# Patient Record
Sex: FEMALE | Race: BLACK OR AFRICAN AMERICAN | NOT HISPANIC OR LATINO | Employment: FULL TIME | RURAL
[De-identification: names, ages, dates, MRNs, and addresses within clinical notes are randomized per-mention and may not be internally consistent; named-entity substitution may affect disease eponyms.]

---

## 2021-04-16 ENCOUNTER — OFFICE VISIT (OUTPATIENT)
Dept: FAMILY MEDICINE | Facility: CLINIC | Age: 46
End: 2021-04-16
Payer: COMMERCIAL

## 2021-04-16 VITALS
BODY MASS INDEX: 45.24 KG/M2 | SYSTOLIC BLOOD PRESSURE: 120 MMHG | HEIGHT: 64 IN | RESPIRATION RATE: 16 BRPM | DIASTOLIC BLOOD PRESSURE: 80 MMHG | WEIGHT: 265 LBS | TEMPERATURE: 97 F | HEART RATE: 100 BPM

## 2021-04-16 DIAGNOSIS — I10 ESSENTIAL HYPERTENSION: ICD-10-CM

## 2021-04-16 DIAGNOSIS — Z79.899 ENCOUNTER FOR LONG-TERM (CURRENT) USE OF OTHER MEDICATIONS: ICD-10-CM

## 2021-04-16 PROCEDURE — 99213 OFFICE O/P EST LOW 20 MIN: CPT | Mod: ,,, | Performed by: FAMILY MEDICINE

## 2021-04-16 PROCEDURE — 99213 PR OFFICE/OUTPT VISIT, EST, LEVL III, 20-29 MIN: ICD-10-PCS | Mod: ,,, | Performed by: FAMILY MEDICINE

## 2021-04-16 RX ORDER — LOSARTAN POTASSIUM 100 MG/1
100 TABLET ORAL DAILY
COMMUNITY
End: 2023-10-19

## 2021-04-16 RX ORDER — CLONIDINE HYDROCHLORIDE 0.1 MG/1
0.1 TABLET ORAL 2 TIMES DAILY
COMMUNITY

## 2021-04-16 RX ORDER — DOXAZOSIN 4 MG/1
4 TABLET ORAL 2 TIMES DAILY
COMMUNITY
End: 2021-04-18 | Stop reason: ALTCHOICE

## 2021-04-16 RX ORDER — LEVOMILNACIPRAN HYDROCHLORIDE 80 MG/1
80 CAPSULE, EXTENDED RELEASE ORAL DAILY
COMMUNITY
End: 2021-04-28 | Stop reason: SDUPTHER

## 2021-04-16 RX ORDER — SEMAGLUTIDE 1.34 MG/ML
1 INJECTION, SOLUTION SUBCUTANEOUS
COMMUNITY
End: 2021-11-24 | Stop reason: DRUGHIGH

## 2021-04-16 RX ORDER — ERGOCALCIFEROL 1.25 MG/1
50000 CAPSULE ORAL
COMMUNITY
End: 2022-11-04

## 2021-04-16 RX ORDER — METFORMIN HYDROCHLORIDE 500 MG/1
500 TABLET ORAL 2 TIMES DAILY
COMMUNITY
End: 2021-11-24 | Stop reason: SDUPTHER

## 2021-04-16 RX ORDER — AMLODIPINE BESYLATE 10 MG/1
10 TABLET ORAL DAILY
COMMUNITY
End: 2023-09-19

## 2021-04-16 RX ORDER — GABAPENTIN 300 MG/1
300 CAPSULE ORAL NIGHTLY PRN
COMMUNITY
End: 2023-09-19

## 2021-04-16 RX ORDER — HYDRALAZINE HYDROCHLORIDE 10 MG/1
10 TABLET, FILM COATED ORAL 3 TIMES DAILY PRN
COMMUNITY
End: 2021-11-18

## 2021-04-16 RX ORDER — SPIRONOLACTONE 25 MG/1
25 TABLET ORAL DAILY
COMMUNITY
End: 2021-11-18

## 2021-04-16 RX ORDER — FLUTICASONE PROPIONATE 50 MCG
2 SPRAY, SUSPENSION (ML) NASAL DAILY
COMMUNITY
End: 2021-10-21 | Stop reason: SDUPTHER

## 2021-04-28 RX ORDER — NEBIVOLOL 5 MG/1
TABLET ORAL
Qty: 60 TABLET | Refills: 0 | Status: SHIPPED | OUTPATIENT
Start: 2021-04-28 | End: 2021-04-29

## 2021-04-28 RX ORDER — LEVOMILNACIPRAN HYDROCHLORIDE 80 MG/1
80 CAPSULE, EXTENDED RELEASE ORAL DAILY
Qty: 30 CAPSULE | Refills: 11 | Status: SHIPPED | OUTPATIENT
Start: 2021-04-28 | End: 2022-05-25 | Stop reason: SDUPTHER

## 2021-04-29 ENCOUNTER — TELEPHONE (OUTPATIENT)
Dept: FAMILY MEDICINE | Facility: CLINIC | Age: 46
End: 2021-04-29

## 2021-04-29 RX ORDER — CARVEDILOL 6.25 MG/1
6.25 TABLET ORAL 2 TIMES DAILY WITH MEALS
Qty: 60 TABLET | Refills: 1 | Status: SHIPPED | OUTPATIENT
Start: 2021-04-29 | End: 2022-04-24 | Stop reason: DRUGHIGH

## 2021-06-09 ENCOUNTER — TELEPHONE (OUTPATIENT)
Dept: FAMILY MEDICINE | Facility: CLINIC | Age: 46
End: 2021-06-09

## 2021-06-10 RX ORDER — FLUTICASONE PROPIONATE 50 MCG
1 SPRAY, SUSPENSION (ML) NASAL DAILY
Qty: 16 G | Refills: 11 | Status: SHIPPED | OUTPATIENT
Start: 2021-06-10 | End: 2023-03-23 | Stop reason: SDUPTHER

## 2021-06-10 RX ORDER — AMOXICILLIN 875 MG/1
875 TABLET, FILM COATED ORAL 2 TIMES DAILY
Qty: 20 TABLET | Refills: 0 | Status: SHIPPED | OUTPATIENT
Start: 2021-06-10 | End: 2021-06-20

## 2021-06-10 RX ORDER — DEXBROMPHENIRAMINE MALEATE, DEXTROMETHORPHAN HYDROBROMIDE AND PHENYLEPHRINE HYDROCHLORIDE 2; 15; 7.5 MG/5ML; MG/5ML; MG/5ML
LIQUID ORAL
Qty: 473 ML | Refills: 0 | Status: SHIPPED | OUTPATIENT
Start: 2021-06-10 | End: 2021-11-18

## 2021-10-21 ENCOUNTER — TELEPHONE (OUTPATIENT)
Dept: FAMILY MEDICINE | Facility: CLINIC | Age: 46
End: 2021-10-21

## 2021-10-21 RX ORDER — AMOXICILLIN 875 MG/1
875 TABLET, FILM COATED ORAL 2 TIMES DAILY
Qty: 20 TABLET | Refills: 0 | Status: SHIPPED | OUTPATIENT
Start: 2021-10-21 | End: 2021-10-31

## 2021-10-21 RX ORDER — FLUTICASONE PROPIONATE 50 MCG
2 SPRAY, SUSPENSION (ML) NASAL DAILY
Qty: 16 G | Refills: 11 | Status: SHIPPED | OUTPATIENT
Start: 2021-10-21 | End: 2022-09-23 | Stop reason: ALTCHOICE

## 2021-11-18 ENCOUNTER — OFFICE VISIT (OUTPATIENT)
Dept: FAMILY MEDICINE | Facility: CLINIC | Age: 46
End: 2021-11-18
Payer: COMMERCIAL

## 2021-11-18 VITALS
OXYGEN SATURATION: 98 % | RESPIRATION RATE: 16 BRPM | HEART RATE: 88 BPM | BODY MASS INDEX: 46.26 KG/M2 | TEMPERATURE: 97 F | WEIGHT: 271 LBS | SYSTOLIC BLOOD PRESSURE: 130 MMHG | HEIGHT: 64 IN | DIASTOLIC BLOOD PRESSURE: 70 MMHG

## 2021-11-18 DIAGNOSIS — E11.9 DIABETES MELLITUS WITHOUT COMPLICATION: ICD-10-CM

## 2021-11-18 DIAGNOSIS — F32.A DEPRESSION, UNSPECIFIED DEPRESSION TYPE: Primary | ICD-10-CM

## 2021-11-18 DIAGNOSIS — Z79.899 ENCOUNTER FOR LONG-TERM (CURRENT) USE OF OTHER MEDICATIONS: ICD-10-CM

## 2021-11-18 DIAGNOSIS — I10 ESSENTIAL HYPERTENSION: ICD-10-CM

## 2021-11-18 DIAGNOSIS — Z12.31 SCREENING MAMMOGRAM, ENCOUNTER FOR: ICD-10-CM

## 2021-11-18 LAB
ALBUMIN SERPL BCP-MCNC: 3.4 G/DL (ref 3.5–5)
ALBUMIN/GLOB SERPL: 0.7 {RATIO}
ALP SERPL-CCNC: 77 U/L (ref 39–100)
ALT SERPL W P-5'-P-CCNC: 42 U/L (ref 13–56)
ANION GAP SERPL CALCULATED.3IONS-SCNC: 7 MMOL/L (ref 7–16)
AST SERPL W P-5'-P-CCNC: 33 U/L (ref 15–37)
BASOPHILS # BLD AUTO: 0.1 K/UL (ref 0–0.2)
BASOPHILS NFR BLD AUTO: 1.2 % (ref 0–1)
BILIRUB SERPL-MCNC: 0.2 MG/DL (ref 0–1.2)
BUN SERPL-MCNC: 10 MG/DL (ref 7–18)
BUN/CREAT SERPL: 12 (ref 6–20)
CALCIUM SERPL-MCNC: 9.1 MG/DL (ref 8.5–10.1)
CHLORIDE SERPL-SCNC: 105 MMOL/L (ref 98–107)
CHOLEST SERPL-MCNC: 191 MG/DL (ref 0–200)
CHOLEST/HDLC SERPL: 3.5 {RATIO}
CO2 SERPL-SCNC: 31 MMOL/L (ref 21–32)
CREAT SERPL-MCNC: 0.83 MG/DL (ref 0.55–1.02)
DIFFERENTIAL METHOD BLD: ABNORMAL
EOSINOPHIL # BLD AUTO: 0.17 K/UL (ref 0–0.5)
EOSINOPHIL NFR BLD AUTO: 2.1 % (ref 1–4)
ERYTHROCYTE [DISTWIDTH] IN BLOOD BY AUTOMATED COUNT: 13.4 % (ref 11.5–14.5)
EST. AVERAGE GLUCOSE BLD GHB EST-MCNC: 160 MG/DL
GLOBULIN SER-MCNC: 4.6 G/DL (ref 2–4)
GLUCOSE SERPL-MCNC: 124 MG/DL (ref 74–106)
HBA1C MFR BLD HPLC: 7.4 % (ref 4.5–6.6)
HCT VFR BLD AUTO: 39.4 % (ref 38–47)
HDLC SERPL-MCNC: 54 MG/DL (ref 40–60)
HGB BLD-MCNC: 12.5 G/DL (ref 12–16)
IMM GRANULOCYTES # BLD AUTO: 0.03 K/UL (ref 0–0.04)
IMM GRANULOCYTES NFR BLD: 0.4 % (ref 0–0.4)
LDLC SERPL CALC-MCNC: 81 MG/DL
LDLC/HDLC SERPL: 1.5 {RATIO}
LYMPHOCYTES # BLD AUTO: 2.97 K/UL (ref 1–4.8)
LYMPHOCYTES NFR BLD AUTO: 36.7 % (ref 27–41)
MCH RBC QN AUTO: 27 PG (ref 27–31)
MCHC RBC AUTO-ENTMCNC: 31.7 G/DL (ref 32–36)
MCV RBC AUTO: 85.1 FL (ref 80–96)
MONOCYTES # BLD AUTO: 0.51 K/UL (ref 0–0.8)
MONOCYTES NFR BLD AUTO: 6.3 % (ref 2–6)
MPC BLD CALC-MCNC: 11.4 FL (ref 9.4–12.4)
NEUTROPHILS # BLD AUTO: 4.31 K/UL (ref 1.8–7.7)
NEUTROPHILS NFR BLD AUTO: 53.3 % (ref 53–65)
NONHDLC SERPL-MCNC: 137 MG/DL
NRBC # BLD AUTO: 0 X10E3/UL
NRBC, AUTO (.00): 0 %
PLATELET # BLD AUTO: 290 K/UL (ref 150–400)
POTASSIUM SERPL-SCNC: 3.8 MMOL/L (ref 3.5–5.1)
PROT SERPL-MCNC: 8 G/DL (ref 6.4–8.2)
RBC # BLD AUTO: 4.63 M/UL (ref 4.2–5.4)
SODIUM SERPL-SCNC: 139 MMOL/L (ref 136–145)
TRIGL SERPL-MCNC: 280 MG/DL (ref 35–150)
TSH SERPL DL<=0.005 MIU/L-ACNC: 3.1 UIU/ML (ref 0.36–3.74)
VLDLC SERPL-MCNC: 56 MG/DL
WBC # BLD AUTO: 8.09 K/UL (ref 4.5–11)

## 2021-11-18 PROCEDURE — 80061 LIPID PANEL: CPT | Mod: ,,, | Performed by: CLINICAL MEDICAL LABORATORY

## 2021-11-18 PROCEDURE — 85025 CBC WITH DIFFERENTIAL: ICD-10-PCS | Mod: ,,, | Performed by: CLINICAL MEDICAL LABORATORY

## 2021-11-18 PROCEDURE — 84443 ASSAY THYROID STIM HORMONE: CPT | Mod: ,,, | Performed by: CLINICAL MEDICAL LABORATORY

## 2021-11-18 PROCEDURE — 99214 OFFICE O/P EST MOD 30 MIN: CPT | Mod: ,,, | Performed by: FAMILY MEDICINE

## 2021-11-18 PROCEDURE — 99214 PR OFFICE/OUTPT VISIT, EST, LEVL IV, 30-39 MIN: ICD-10-PCS | Mod: ,,, | Performed by: FAMILY MEDICINE

## 2021-11-18 PROCEDURE — 83036 HEMOGLOBIN A1C: ICD-10-PCS | Mod: ,,, | Performed by: CLINICAL MEDICAL LABORATORY

## 2021-11-18 PROCEDURE — 83036 HEMOGLOBIN GLYCOSYLATED A1C: CPT | Mod: ,,, | Performed by: CLINICAL MEDICAL LABORATORY

## 2021-11-18 PROCEDURE — 80053 COMPREHENSIVE METABOLIC PANEL: ICD-10-PCS | Mod: ,,, | Performed by: CLINICAL MEDICAL LABORATORY

## 2021-11-18 PROCEDURE — 80053 COMPREHEN METABOLIC PANEL: CPT | Mod: ,,, | Performed by: CLINICAL MEDICAL LABORATORY

## 2021-11-18 PROCEDURE — 84443 TSH: ICD-10-PCS | Mod: ,,, | Performed by: CLINICAL MEDICAL LABORATORY

## 2021-11-18 PROCEDURE — 80061 LIPID PANEL: ICD-10-PCS | Mod: ,,, | Performed by: CLINICAL MEDICAL LABORATORY

## 2021-11-18 PROCEDURE — 85025 COMPLETE CBC W/AUTO DIFF WBC: CPT | Mod: ,,, | Performed by: CLINICAL MEDICAL LABORATORY

## 2021-11-18 RX ORDER — ARIPIPRAZOLE 2 MG/1
2 TABLET ORAL DAILY
Qty: 30 TABLET | Refills: 0 | Status: SHIPPED | OUTPATIENT
Start: 2021-11-18 | End: 2022-02-14 | Stop reason: SDUPTHER

## 2021-11-18 RX ORDER — HYDROCHLOROTHIAZIDE 12.5 MG/1
12.5 CAPSULE ORAL DAILY
COMMUNITY
End: 2023-09-19

## 2021-11-24 RX ORDER — METFORMIN HYDROCHLORIDE 500 MG/1
500 TABLET ORAL 2 TIMES DAILY
Qty: 180 TABLET | Refills: 3 | Status: SHIPPED | OUTPATIENT
Start: 2021-11-24 | End: 2023-09-19

## 2021-11-24 RX ORDER — SEMAGLUTIDE 1.34 MG/ML
0.25 INJECTION, SOLUTION SUBCUTANEOUS
Qty: 1 PEN | Refills: 5 | Status: SHIPPED | OUTPATIENT
Start: 2021-11-24 | End: 2021-12-29 | Stop reason: SDUPTHER

## 2021-12-16 LAB — BCS RECOMMENDATION EXT: NORMAL

## 2021-12-27 DIAGNOSIS — U07.1 COVID-19: Primary | ICD-10-CM

## 2021-12-27 RX ORDER — ALBUTEROL SULFATE 90 UG/1
2 AEROSOL, METERED RESPIRATORY (INHALATION) EVERY 4 HOURS PRN
Qty: 18 G | Refills: 1 | Status: SHIPPED | OUTPATIENT
Start: 2021-12-27 | End: 2023-09-19

## 2021-12-27 RX ORDER — AZITHROMYCIN 250 MG/1
TABLET, FILM COATED ORAL
Qty: 6 TABLET | Refills: 0 | Status: SHIPPED | OUTPATIENT
Start: 2021-12-27 | End: 2022-06-15

## 2021-12-29 ENCOUNTER — TELEPHONE (OUTPATIENT)
Dept: FAMILY MEDICINE | Facility: CLINIC | Age: 46
End: 2021-12-29
Payer: COMMERCIAL

## 2021-12-29 RX ORDER — SEMAGLUTIDE 1.34 MG/ML
0.5 INJECTION, SOLUTION SUBCUTANEOUS
Qty: 1 PEN | Refills: 11 | Status: SHIPPED | OUTPATIENT
Start: 2021-12-29 | End: 2022-02-14

## 2022-01-03 ENCOUNTER — PATIENT MESSAGE (OUTPATIENT)
Dept: ADMINISTRATIVE | Facility: OTHER | Age: 47
End: 2022-01-03

## 2022-01-05 ENCOUNTER — TELEPHONE (OUTPATIENT)
Dept: FAMILY MEDICINE | Facility: CLINIC | Age: 47
End: 2022-01-05
Payer: COMMERCIAL

## 2022-01-05 RX ORDER — VALACYCLOVIR HYDROCHLORIDE 500 MG/1
TABLET, FILM COATED ORAL
Qty: 12 TABLET | Refills: 1 | Status: SHIPPED | OUTPATIENT
Start: 2022-01-05 | End: 2023-01-11 | Stop reason: SDUPTHER

## 2022-01-20 ENCOUNTER — TELEPHONE (OUTPATIENT)
Dept: FAMILY MEDICINE | Facility: CLINIC | Age: 47
End: 2022-01-20
Payer: COMMERCIAL

## 2022-02-07 ENCOUNTER — TELEPHONE (OUTPATIENT)
Dept: FAMILY MEDICINE | Facility: CLINIC | Age: 47
End: 2022-02-07
Payer: COMMERCIAL

## 2022-02-07 RX ORDER — PROMETHAZINE HYDROCHLORIDE 25 MG/1
25 TABLET ORAL EVERY 6 HOURS PRN
Qty: 20 TABLET | Refills: 0 | Status: SHIPPED | OUTPATIENT
Start: 2022-02-07 | End: 2023-09-19

## 2022-02-14 ENCOUNTER — TELEPHONE (OUTPATIENT)
Dept: FAMILY MEDICINE | Facility: CLINIC | Age: 47
End: 2022-02-14
Payer: COMMERCIAL

## 2022-02-14 RX ORDER — SEMAGLUTIDE 1.34 MG/ML
1 INJECTION, SOLUTION SUBCUTANEOUS
Qty: 2 PEN | Refills: 11 | Status: SHIPPED | OUTPATIENT
Start: 2022-02-14 | End: 2022-11-04

## 2022-02-14 RX ORDER — ARIPIPRAZOLE 2 MG/1
2 TABLET ORAL DAILY
Qty: 30 TABLET | Refills: 11 | Status: SHIPPED | OUTPATIENT
Start: 2022-02-14 | End: 2022-09-23

## 2022-04-22 ENCOUNTER — TELEPHONE (OUTPATIENT)
Dept: FAMILY MEDICINE | Facility: CLINIC | Age: 47
End: 2022-04-22
Payer: COMMERCIAL

## 2022-04-22 RX ORDER — LORAZEPAM 0.5 MG/1
0.5 TABLET ORAL EVERY 8 HOURS PRN
Qty: 4 TABLET | Refills: 0 | Status: SHIPPED | OUTPATIENT
Start: 2022-04-22 | End: 2023-09-19

## 2022-04-24 ENCOUNTER — OFFICE VISIT (OUTPATIENT)
Dept: FAMILY MEDICINE | Facility: CLINIC | Age: 47
End: 2022-04-24
Payer: COMMERCIAL

## 2022-04-24 VITALS
OXYGEN SATURATION: 99 % | SYSTOLIC BLOOD PRESSURE: 140 MMHG | WEIGHT: 255.19 LBS | BODY MASS INDEX: 43.57 KG/M2 | RESPIRATION RATE: 20 BRPM | DIASTOLIC BLOOD PRESSURE: 100 MMHG | HEART RATE: 100 BPM | HEIGHT: 64 IN | TEMPERATURE: 98 F

## 2022-04-24 DIAGNOSIS — M54.9 BACK PAIN, UNSPECIFIED BACK LOCATION, UNSPECIFIED BACK PAIN LATERALITY, UNSPECIFIED CHRONICITY: Primary | ICD-10-CM

## 2022-04-24 PROCEDURE — 99213 PR OFFICE/OUTPT VISIT, EST, LEVL III, 20-29 MIN: ICD-10-PCS | Mod: 25,,, | Performed by: NURSE PRACTITIONER

## 2022-04-24 PROCEDURE — 96372 PR INJECTION,THERAP/PROPH/DIAG2ST, IM OR SUBCUT: ICD-10-PCS | Mod: ,,, | Performed by: NURSE PRACTITIONER

## 2022-04-24 PROCEDURE — 96372 THER/PROPH/DIAG INJ SC/IM: CPT | Mod: ,,, | Performed by: NURSE PRACTITIONER

## 2022-04-24 PROCEDURE — 99051 MED SERV EVE/WKEND/HOLIDAY: CPT | Mod: ,,, | Performed by: NURSE PRACTITIONER

## 2022-04-24 PROCEDURE — 99051 PR MEDICAL SERVICES, EVE/WKEND/HOLIDAY: ICD-10-PCS | Mod: ,,, | Performed by: NURSE PRACTITIONER

## 2022-04-24 PROCEDURE — 99213 OFFICE O/P EST LOW 20 MIN: CPT | Mod: 25,,, | Performed by: NURSE PRACTITIONER

## 2022-04-24 RX ORDER — HYDROCHLOROTHIAZIDE 25 MG/1
25 TABLET ORAL DAILY
COMMUNITY
Start: 2021-12-27 | End: 2022-04-24 | Stop reason: DRUGHIGH

## 2022-04-24 RX ORDER — CHLORHEXIDINE GLUCONATE ORAL RINSE 1.2 MG/ML
SOLUTION DENTAL
COMMUNITY
Start: 2022-03-29 | End: 2024-01-05

## 2022-04-24 RX ORDER — DEXAMETHASONE SODIUM PHOSPHATE 4 MG/ML
4 INJECTION, SOLUTION INTRA-ARTICULAR; INTRALESIONAL; INTRAMUSCULAR; INTRAVENOUS; SOFT TISSUE
Status: COMPLETED | OUTPATIENT
Start: 2022-04-24 | End: 2022-04-24

## 2022-04-24 RX ORDER — DICLOFENAC SODIUM 75 MG/1
75 TABLET, DELAYED RELEASE ORAL 2 TIMES DAILY PRN
Qty: 60 TABLET | Refills: 0 | Status: SHIPPED | OUTPATIENT
Start: 2022-04-24 | End: 2023-09-19

## 2022-04-24 RX ORDER — CARVEDILOL 12.5 MG/1
TABLET ORAL
COMMUNITY
Start: 2021-11-18 | End: 2023-09-19

## 2022-04-24 RX ORDER — TIZANIDINE 4 MG/1
4 TABLET ORAL EVERY 8 HOURS PRN
Qty: 60 TABLET | Refills: 0 | Status: SHIPPED | OUTPATIENT
Start: 2022-04-24 | End: 2022-05-04

## 2022-04-24 RX ORDER — KETOROLAC TROMETHAMINE 30 MG/ML
60 INJECTION, SOLUTION INTRAMUSCULAR; INTRAVENOUS
Status: COMPLETED | OUTPATIENT
Start: 2022-04-24 | End: 2022-04-24

## 2022-04-24 RX ADMIN — DEXAMETHASONE SODIUM PHOSPHATE 4 MG: 4 INJECTION, SOLUTION INTRA-ARTICULAR; INTRALESIONAL; INTRAMUSCULAR; INTRAVENOUS; SOFT TISSUE at 06:04

## 2022-04-24 RX ADMIN — KETOROLAC TROMETHAMINE 60 MG: 30 INJECTION, SOLUTION INTRAMUSCULAR; INTRAVENOUS at 06:04

## 2022-04-24 NOTE — PROGRESS NOTES
"Subjective:       Patient ID: Mimi Davis is a 46 y.o. female.    Chief Complaint: Back Pain (Denies injuries or falls history of bulging disc in back)    Presents to clinic as above. No injury. No bladder or bladder incontinence. No leg weakness. Wants referral back to Dr. Strauss for pain management. Does not have periods due to hx of hysterectomy.     Review of Systems   Constitutional: Negative.    Respiratory: Negative.    Cardiovascular: Negative.    Musculoskeletal: Positive for back pain and myalgias.          Reviewed family, medical, surgical, and social history.    Objective:      BP (!) 140/100 (BP Location: Left arm, Patient Position: Sitting, BP Method: Medium (Automatic))   Pulse 100   Temp 98.4 °F (36.9 °C) (Oral)   Resp 20   Ht 5' 4" (1.626 m)   Wt 115.8 kg (255 lb 3.2 oz)   SpO2 99%   BMI 43.80 kg/m²   Physical Exam  Vitals and nursing note reviewed.   Constitutional:       General: She is not in acute distress.     Appearance: Normal appearance. She is not ill-appearing or diaphoretic.   Cardiovascular:      Rate and Rhythm: Normal rate and regular rhythm.      Heart sounds: Normal heart sounds.   Pulmonary:      Effort: Pulmonary effort is normal.      Breath sounds: Normal breath sounds.   Musculoskeletal:      Lumbar back: Spasms present. No swelling, edema, deformity, signs of trauma, lacerations, tenderness or bony tenderness. Decreased range of motion. Negative right straight leg raise test and negative left straight leg raise test. No scoliosis.   Skin:     General: Skin is warm and dry.   Neurological:      Mental Status: She is alert and oriented to person, place, and time.   Psychiatric:         Mood and Affect: Mood normal.         Behavior: Behavior normal.         Thought Content: Thought content normal.         Judgment: Judgment normal.            No visits with results within 1 Day(s) from this visit.   Latest known visit with results is:   Appointment on 01/03/2022 "   Component Date Value Ref Range Status    SARS Coronavirus 2 Antigen 01/03/2022 Positive (A) Negative Final     Acceptable 01/03/2022 Yes   Final      Assessment:       1. Back pain, unspecified back location, unspecified back pain laterality, unspecified chronicity        Plan:       Back pain, unspecified back location, unspecified back pain laterality, unspecified chronicity  -     ketorolac injection 60 mg  -     dexamethasone injection 4 mg  -     diclofenac (VOLTAREN) 75 MG EC tablet; Take 1 tablet (75 mg total) by mouth 2 (two) times daily as needed (back pain).  Dispense: 60 tablet; Refill: 0  -     tiZANidine (ZANAFLEX) 4 MG tablet; Take 1 tablet (4 mg total) by mouth every 8 (eight) hours as needed (muscle spasms).  Dispense: 60 tablet; Refill: 0  -     Ambulatory referral/consult to Pain Clinic; Future; Expected date: 05/01/2022    RTC PRN  F/U with referral clerk in 1 week          Risks, benefits, and side effects were discussed with the patient. All questions were answered to the fullest satisfaction of the patient, and pt verbalized understanding and agreement to treatment plan. Pt was to call with any new or worsening symptoms, or present to the ER.

## 2022-05-25 RX ORDER — LEVOMILNACIPRAN HYDROCHLORIDE 80 MG/1
80 CAPSULE, EXTENDED RELEASE ORAL DAILY
Qty: 30 CAPSULE | Refills: 11 | Status: SHIPPED | OUTPATIENT
Start: 2022-05-25 | End: 2023-05-26 | Stop reason: SDUPTHER

## 2022-06-15 ENCOUNTER — TELEPHONE (OUTPATIENT)
Dept: FAMILY MEDICINE | Facility: CLINIC | Age: 47
End: 2022-06-15
Payer: COMMERCIAL

## 2022-06-15 RX ORDER — AMOXICILLIN 875 MG/1
875 TABLET, FILM COATED ORAL 2 TIMES DAILY
Qty: 20 TABLET | Refills: 0 | Status: SHIPPED | OUTPATIENT
Start: 2022-06-15 | End: 2022-06-25

## 2022-06-16 ENCOUNTER — TELEPHONE (OUTPATIENT)
Dept: FAMILY MEDICINE | Facility: CLINIC | Age: 47
End: 2022-06-16
Payer: COMMERCIAL

## 2022-06-16 RX ORDER — FLUCONAZOLE 150 MG/1
TABLET ORAL
Qty: 12 TABLET | Refills: 0 | Status: SHIPPED | OUTPATIENT
Start: 2022-06-16 | End: 2022-09-23 | Stop reason: SDUPTHER

## 2022-09-23 ENCOUNTER — OFFICE VISIT (OUTPATIENT)
Dept: FAMILY MEDICINE | Facility: CLINIC | Age: 47
End: 2022-09-23
Payer: COMMERCIAL

## 2022-09-23 VITALS
SYSTOLIC BLOOD PRESSURE: 130 MMHG | BODY MASS INDEX: 45.24 KG/M2 | HEART RATE: 78 BPM | RESPIRATION RATE: 16 BRPM | TEMPERATURE: 99 F | DIASTOLIC BLOOD PRESSURE: 70 MMHG | WEIGHT: 265 LBS | OXYGEN SATURATION: 99 % | HEIGHT: 64 IN

## 2022-09-23 DIAGNOSIS — I10 PRIMARY HYPERTENSION: ICD-10-CM

## 2022-09-23 DIAGNOSIS — E11.9 TYPE 2 DIABETES MELLITUS WITHOUT COMPLICATION, WITHOUT LONG-TERM CURRENT USE OF INSULIN: ICD-10-CM

## 2022-09-23 DIAGNOSIS — J01.00 ACUTE NON-RECURRENT MAXILLARY SINUSITIS: Primary | ICD-10-CM

## 2022-09-23 PROCEDURE — 3075F PR MOST RECENT SYSTOLIC BLOOD PRESS GE 130-139MM HG: ICD-10-PCS | Mod: CPTII,,, | Performed by: FAMILY MEDICINE

## 2022-09-23 PROCEDURE — 4010F ACE/ARB THERAPY RXD/TAKEN: CPT | Mod: CPTII,,, | Performed by: FAMILY MEDICINE

## 2022-09-23 PROCEDURE — 1160F RVW MEDS BY RX/DR IN RCRD: CPT | Mod: CPTII,,, | Performed by: FAMILY MEDICINE

## 2022-09-23 PROCEDURE — 99213 OFFICE O/P EST LOW 20 MIN: CPT | Mod: ,,, | Performed by: FAMILY MEDICINE

## 2022-09-23 PROCEDURE — 3078F DIAST BP <80 MM HG: CPT | Mod: CPTII,,, | Performed by: FAMILY MEDICINE

## 2022-09-23 PROCEDURE — 99213 PR OFFICE/OUTPT VISIT, EST, LEVL III, 20-29 MIN: ICD-10-PCS | Mod: ,,, | Performed by: FAMILY MEDICINE

## 2022-09-23 PROCEDURE — 3075F SYST BP GE 130 - 139MM HG: CPT | Mod: CPTII,,, | Performed by: FAMILY MEDICINE

## 2022-09-23 PROCEDURE — 1159F PR MEDICATION LIST DOCUMENTED IN MEDICAL RECORD: ICD-10-PCS | Mod: CPTII,,, | Performed by: FAMILY MEDICINE

## 2022-09-23 PROCEDURE — 4010F PR ACE/ARB THEARPY RXD/TAKEN: ICD-10-PCS | Mod: CPTII,,, | Performed by: FAMILY MEDICINE

## 2022-09-23 PROCEDURE — 1160F PR REVIEW ALL MEDS BY PRESCRIBER/CLIN PHARMACIST DOCUMENTED: ICD-10-PCS | Mod: CPTII,,, | Performed by: FAMILY MEDICINE

## 2022-09-23 PROCEDURE — 3078F PR MOST RECENT DIASTOLIC BLOOD PRESSURE < 80 MM HG: ICD-10-PCS | Mod: CPTII,,, | Performed by: FAMILY MEDICINE

## 2022-09-23 PROCEDURE — 3008F BODY MASS INDEX DOCD: CPT | Mod: CPTII,,, | Performed by: FAMILY MEDICINE

## 2022-09-23 PROCEDURE — 1159F MED LIST DOCD IN RCRD: CPT | Mod: CPTII,,, | Performed by: FAMILY MEDICINE

## 2022-09-23 PROCEDURE — 3008F PR BODY MASS INDEX (BMI) DOCUMENTED: ICD-10-PCS | Mod: CPTII,,, | Performed by: FAMILY MEDICINE

## 2022-09-23 RX ORDER — PRENATAL VIT 91/IRON/FOLIC/DHA 28-975-200
COMBINATION PACKAGE (EA) ORAL NIGHTLY
Qty: 30 G | Refills: 1 | Status: SHIPPED | OUTPATIENT
Start: 2022-09-23 | End: 2023-09-19

## 2022-09-23 RX ORDER — CODEINE PHOSPHATE AND GUAIFENESIN 10; 100 MG/5ML; MG/5ML
5 SOLUTION ORAL EVERY 6 HOURS PRN
Qty: 120 ML | Refills: 0 | Status: SHIPPED | OUTPATIENT
Start: 2022-09-23 | End: 2022-10-03

## 2022-09-23 RX ORDER — AMOXICILLIN AND CLAVULANATE POTASSIUM 875; 125 MG/1; MG/1
1 TABLET, FILM COATED ORAL EVERY 12 HOURS
Qty: 20 TABLET | Refills: 0 | Status: SHIPPED | OUTPATIENT
Start: 2022-09-23 | End: 2023-05-05

## 2022-09-23 RX ORDER — FLUCONAZOLE 150 MG/1
TABLET ORAL
Qty: 4 TABLET | Refills: 0 | Status: SHIPPED | OUTPATIENT
Start: 2022-09-23 | End: 2023-09-19

## 2022-09-23 NOTE — PROGRESS NOTES
Subjective:       Patient ID: Mimi Davis is a 47 y.o. female.    Chief Complaint: Nasal Congestion, Sore Throat, and Rash    About a week of drainage and coughing up green sputum.  No fever, no congestion.  Has a little rash, dry patch on left cheek of face.    Review of Systems   Constitutional:  Negative for appetite change, chills, fatigue, fever and unexpected weight change.   HENT:  Positive for nasal congestion, rhinorrhea and sinus pressure/congestion.    Respiratory:  Negative for cough and shortness of breath.    Cardiovascular:  Negative for chest pain and leg swelling.   Gastrointestinal:  Negative for abdominal pain.   Musculoskeletal:  Negative for arthralgias.   Integumentary:  Negative for rash.   Neurological:  Negative for weakness.   Psychiatric/Behavioral:  The patient is not nervous/anxious.        Objective:      Physical Exam  Constitutional:       General: She is not in acute distress.     Appearance: Normal appearance.   HENT:      Right Ear: Tympanic membrane, ear canal and external ear normal.      Left Ear: Tympanic membrane, ear canal and external ear normal.      Nose: Congestion and rhinorrhea present.      Mouth/Throat:      Pharynx: Posterior oropharyngeal erythema present. No oropharyngeal exudate.   Cardiovascular:      Rate and Rhythm: Normal rate and regular rhythm.      Heart sounds: Normal heart sounds.   Pulmonary:      Breath sounds: Normal breath sounds.   Abdominal:      General: Abdomen is flat.      Palpations: Abdomen is soft.   Skin:     General: Skin is warm and dry.   Neurological:      Mental Status: She is alert. Mental status is at baseline.   Psychiatric:         Mood and Affect: Mood normal.         Behavior: Behavior normal.         Thought Content: Thought content normal.         Judgment: Judgment normal.       Assessment:       1. Acute non-recurrent maxillary sinusitis        2. Type 2 diabetes mellitus without complication, without long-term current  use of insulin        3. Primary hypertension            Plan:       Written order for labs for her to get at [facility]  UTD maryann, eye exam  Pt will think about cscope  Gets flu vacc at work  Cheratussin,amoxil, diflucan--14th md1  Antifungal cream trial for spot on face

## 2022-09-26 PROBLEM — J01.00 ACUTE NON-RECURRENT MAXILLARY SINUSITIS: Status: ACTIVE | Noted: 2022-09-26

## 2022-10-28 LAB
CHOLEST SERPL-MSCNC: 210 MG/DL (ref 0–200)
HBA1C MFR BLD: 7 % (ref 4–6)
HDLC SERPL-MCNC: 50 MG/DL (ref 35–70)
LDLC SERPL CALC-MCNC: 146 MG/DL (ref 0–160)
TRIGL SERPL-MCNC: 170 MG/DL (ref 40–160)

## 2022-11-04 ENCOUNTER — TELEPHONE (OUTPATIENT)
Dept: FAMILY MEDICINE | Facility: CLINIC | Age: 47
End: 2022-11-04
Payer: COMMERCIAL

## 2022-11-04 RX ORDER — ATORVASTATIN CALCIUM 10 MG/1
10 TABLET, FILM COATED ORAL DAILY
COMMUNITY
End: 2023-09-19

## 2022-11-04 RX ORDER — FLUCONAZOLE 150 MG/1
TABLET ORAL
Qty: 4 TABLET | Refills: 0 | Status: SHIPPED | OUTPATIENT
Start: 2022-11-04 | End: 2023-09-19

## 2022-11-04 RX ORDER — ERGOCALCIFEROL 1.25 MG/1
50000 CAPSULE ORAL
Qty: 4 CAPSULE | Refills: 0 | Status: SHIPPED | OUTPATIENT
Start: 2022-11-04 | End: 2023-09-19

## 2022-11-04 NOTE — TELEPHONE ENCOUNTER
She faxed her labs over from [facility].  Start vit D3 2000IU daily plus drisdol 50K weekly x 1 mo.  Samples of farxiga 5mg x 1 mo given to try plus could increase metformin to 500mg BID.  Dr. Trammell started her on lipitor 10mg.  Plan recheck labs in 6 mo including thyroid due to her numbers trending towards hypothyroid.

## 2022-11-09 ENCOUNTER — TELEPHONE (OUTPATIENT)
Dept: FAMILY MEDICINE | Facility: CLINIC | Age: 47
End: 2022-11-09
Payer: COMMERCIAL

## 2022-11-09 RX ORDER — SEMAGLUTIDE 1.34 MG/ML
0.25 INJECTION, SOLUTION SUBCUTANEOUS
Qty: 1 PEN | Refills: 11 | Status: SHIPPED | OUTPATIENT
Start: 2022-11-09 | End: 2023-03-23 | Stop reason: SDUPTHER

## 2022-11-09 NOTE — TELEPHONE ENCOUNTER
She did not  the samples that were put up front for her. She wants to start back on the ozempic,needs a refill sent to the pharmacy.

## 2022-11-28 ENCOUNTER — TELEPHONE (OUTPATIENT)
Dept: FAMILY MEDICINE | Facility: CLINIC | Age: 47
End: 2022-11-28
Payer: COMMERCIAL

## 2022-11-28 RX ORDER — OSELTAMIVIR PHOSPHATE 75 MG/1
75 CAPSULE ORAL 2 TIMES DAILY
Qty: 10 CAPSULE | Refills: 0 | Status: SHIPPED | OUTPATIENT
Start: 2022-11-28 | End: 2022-12-03

## 2022-11-28 RX ORDER — PROMETHAZINE HYDROCHLORIDE AND DEXTROMETHORPHAN HYDROBROMIDE 6.25; 15 MG/5ML; MG/5ML
5 SYRUP ORAL EVERY 6 HOURS PRN
Qty: 118 ML | Refills: 0 | Status: SHIPPED | OUTPATIENT
Start: 2022-11-28 | End: 2022-12-08

## 2022-11-28 NOTE — TELEPHONE ENCOUNTER
Feels like she has the flu,started this weekend.[facility]ques sent to the pharmacy c/o congestion and cough.

## 2022-12-26 PROBLEM — J01.00 ACUTE NON-RECURRENT MAXILLARY SINUSITIS: Status: RESOLVED | Noted: 2022-09-26 | Resolved: 2022-12-26

## 2023-01-11 RX ORDER — VALACYCLOVIR HYDROCHLORIDE 500 MG/1
TABLET, FILM COATED ORAL
Qty: 12 TABLET | Refills: 1 | Status: SHIPPED | OUTPATIENT
Start: 2023-01-11 | End: 2023-09-19

## 2023-03-10 ENCOUNTER — OFFICE VISIT (OUTPATIENT)
Dept: FAMILY MEDICINE | Facility: CLINIC | Age: 48
End: 2023-03-10
Payer: COMMERCIAL

## 2023-03-10 VITALS
OXYGEN SATURATION: 100 % | TEMPERATURE: 98 F | BODY MASS INDEX: 45.24 KG/M2 | RESPIRATION RATE: 18 BRPM | HEIGHT: 64 IN | DIASTOLIC BLOOD PRESSURE: 100 MMHG | WEIGHT: 265 LBS | SYSTOLIC BLOOD PRESSURE: 172 MMHG | HEART RATE: 93 BPM

## 2023-03-10 DIAGNOSIS — M62.838 MUSCLE SPASM: ICD-10-CM

## 2023-03-10 DIAGNOSIS — M54.9 BACK PAIN, UNSPECIFIED BACK LOCATION, UNSPECIFIED BACK PAIN LATERALITY, UNSPECIFIED CHRONICITY: Primary | ICD-10-CM

## 2023-03-10 PROCEDURE — 3008F PR BODY MASS INDEX (BMI) DOCUMENTED: ICD-10-PCS | Mod: CPTII,,, | Performed by: NURSE PRACTITIONER

## 2023-03-10 PROCEDURE — 3080F PR MOST RECENT DIASTOLIC BLOOD PRESSURE >= 90 MM HG: ICD-10-PCS | Mod: CPTII,,, | Performed by: NURSE PRACTITIONER

## 2023-03-10 PROCEDURE — 1159F PR MEDICATION LIST DOCUMENTED IN MEDICAL RECORD: ICD-10-PCS | Mod: CPTII,,, | Performed by: NURSE PRACTITIONER

## 2023-03-10 PROCEDURE — 3008F BODY MASS INDEX DOCD: CPT | Mod: CPTII,,, | Performed by: NURSE PRACTITIONER

## 2023-03-10 PROCEDURE — 99051 PR MEDICAL SERVICES, EVE/WKEND/HOLIDAY: ICD-10-PCS | Mod: ,,, | Performed by: NURSE PRACTITIONER

## 2023-03-10 PROCEDURE — 99213 PR OFFICE/OUTPT VISIT, EST, LEVL III, 20-29 MIN: ICD-10-PCS | Mod: 25,,, | Performed by: NURSE PRACTITIONER

## 2023-03-10 PROCEDURE — 3080F DIAST BP >= 90 MM HG: CPT | Mod: CPTII,,, | Performed by: NURSE PRACTITIONER

## 2023-03-10 PROCEDURE — 1160F PR REVIEW ALL MEDS BY PRESCRIBER/CLIN PHARMACIST DOCUMENTED: ICD-10-PCS | Mod: CPTII,,, | Performed by: NURSE PRACTITIONER

## 2023-03-10 PROCEDURE — 96372 PR INJECTION,THERAP/PROPH/DIAG2ST, IM OR SUBCUT: ICD-10-PCS | Mod: ,,, | Performed by: NURSE PRACTITIONER

## 2023-03-10 PROCEDURE — 99051 MED SERV EVE/WKEND/HOLIDAY: CPT | Mod: ,,, | Performed by: NURSE PRACTITIONER

## 2023-03-10 PROCEDURE — 99213 OFFICE O/P EST LOW 20 MIN: CPT | Mod: 25,,, | Performed by: NURSE PRACTITIONER

## 2023-03-10 PROCEDURE — 96372 THER/PROPH/DIAG INJ SC/IM: CPT | Mod: ,,, | Performed by: NURSE PRACTITIONER

## 2023-03-10 PROCEDURE — 3077F SYST BP >= 140 MM HG: CPT | Mod: CPTII,,, | Performed by: NURSE PRACTITIONER

## 2023-03-10 PROCEDURE — 1160F RVW MEDS BY RX/DR IN RCRD: CPT | Mod: CPTII,,, | Performed by: NURSE PRACTITIONER

## 2023-03-10 PROCEDURE — 1159F MED LIST DOCD IN RCRD: CPT | Mod: CPTII,,, | Performed by: NURSE PRACTITIONER

## 2023-03-10 PROCEDURE — 3077F PR MOST RECENT SYSTOLIC BLOOD PRESSURE >= 140 MM HG: ICD-10-PCS | Mod: CPTII,,, | Performed by: NURSE PRACTITIONER

## 2023-03-10 RX ORDER — HYDROCODONE BITARTRATE AND ACETAMINOPHEN 7.5; 325 MG/1; MG/1
1 TABLET ORAL EVERY 6 HOURS PRN
Qty: 12 TABLET | Refills: 0 | Status: SHIPPED | OUTPATIENT
Start: 2023-03-10 | End: 2023-03-10

## 2023-03-10 RX ORDER — PREDNISONE 20 MG/1
20 TABLET ORAL DAILY
Qty: 5 TABLET | Refills: 0 | Status: SHIPPED | OUTPATIENT
Start: 2023-03-10 | End: 2023-03-15

## 2023-03-10 RX ORDER — TIZANIDINE 4 MG/1
4 TABLET ORAL EVERY 8 HOURS PRN
Qty: 60 TABLET | Refills: 1 | Status: SHIPPED | OUTPATIENT
Start: 2023-03-10 | End: 2023-03-20

## 2023-03-10 RX ORDER — HYDROCODONE BITARTRATE AND ACETAMINOPHEN 7.5; 325 MG/1; MG/1
1 TABLET ORAL EVERY 6 HOURS PRN
Qty: 12 TABLET | Refills: 0 | Status: SHIPPED | OUTPATIENT
Start: 2023-03-10 | End: 2023-09-19

## 2023-03-10 RX ORDER — KETOROLAC TROMETHAMINE 30 MG/ML
60 INJECTION, SOLUTION INTRAMUSCULAR; INTRAVENOUS
Status: COMPLETED | OUTPATIENT
Start: 2023-03-10 | End: 2023-03-10

## 2023-03-10 RX ADMIN — KETOROLAC TROMETHAMINE 60 MG: 30 INJECTION, SOLUTION INTRAMUSCULAR; INTRAVENOUS at 07:03

## 2023-03-11 ENCOUNTER — CLINICAL SUPPORT (OUTPATIENT)
Dept: FAMILY MEDICINE | Facility: CLINIC | Age: 48
End: 2023-03-11
Payer: COMMERCIAL

## 2023-03-11 VITALS — SYSTOLIC BLOOD PRESSURE: 138 MMHG | DIASTOLIC BLOOD PRESSURE: 88 MMHG

## 2023-03-11 DIAGNOSIS — M62.838 MUSCLE SPASM: ICD-10-CM

## 2023-03-11 DIAGNOSIS — M54.9 BACK PAIN, UNSPECIFIED BACK LOCATION, UNSPECIFIED BACK PAIN LATERALITY, UNSPECIFIED CHRONICITY: Primary | ICD-10-CM

## 2023-03-11 PROCEDURE — 96372 PR INJECTION,THERAP/PROPH/DIAG2ST, IM OR SUBCUT: ICD-10-PCS | Mod: ,,, | Performed by: NURSE PRACTITIONER

## 2023-03-11 PROCEDURE — 96372 THER/PROPH/DIAG INJ SC/IM: CPT | Mod: ,,, | Performed by: NURSE PRACTITIONER

## 2023-03-11 RX ORDER — KETOROLAC TROMETHAMINE 30 MG/ML
30 INJECTION, SOLUTION INTRAMUSCULAR; INTRAVENOUS
Status: COMPLETED | OUTPATIENT
Start: 2023-03-11 | End: 2023-03-11

## 2023-03-11 RX ORDER — DEXAMETHASONE SODIUM PHOSPHATE 4 MG/ML
6 INJECTION, SOLUTION INTRA-ARTICULAR; INTRALESIONAL; INTRAMUSCULAR; INTRAVENOUS; SOFT TISSUE
Status: COMPLETED | OUTPATIENT
Start: 2023-03-11 | End: 2023-03-11

## 2023-03-11 RX ADMIN — KETOROLAC TROMETHAMINE 30 MG: 30 INJECTION, SOLUTION INTRAMUSCULAR; INTRAVENOUS at 01:03

## 2023-03-11 RX ADMIN — DEXAMETHASONE SODIUM PHOSPHATE 6 MG: 4 INJECTION, SOLUTION INTRA-ARTICULAR; INTRALESIONAL; INTRAMUSCULAR; INTRAVENOUS; SOFT TISSUE at 01:03

## 2023-03-11 NOTE — PROGRESS NOTES
"Subjective:       Patient ID: Mimi Davis is a 47 y.o. female.    Chief Complaint: Back Pain (Mid back pain;ache x 3 days )    Presents to clinic with c/o mid back pain and spasms. Did some heavy lifting at home this week. No fall. No dysuria, abd pain or hematuria. Pain is definitely worse with movement.     Review of Systems   Constitutional: Negative.    Respiratory: Negative.     Cardiovascular: Negative.    Genitourinary: Negative.    Musculoskeletal:  Positive for back pain and myalgias.        Reviewed family, medical, surgical, and social history.    Objective:      BP (!) 172/100 (BP Location: Right arm, Patient Position: Sitting, BP Method: Large (Manual))   Pulse 93   Temp 98.3 °F (36.8 °C)   Resp 18   Ht 5' 4" (1.626 m)   Wt 120.2 kg (265 lb)   SpO2 100%   BMI 45.49 kg/m²   Physical Exam  Vitals and nursing note reviewed.   Constitutional:       General: She is not in acute distress.     Appearance: Normal appearance. She is not ill-appearing, toxic-appearing or diaphoretic.   HENT:      Head: Normocephalic.      Mouth/Throat:      Mouth: Mucous membranes are moist.   Cardiovascular:      Rate and Rhythm: Normal rate and regular rhythm.      Heart sounds: Normal heart sounds.   Pulmonary:      Effort: Pulmonary effort is normal.      Breath sounds: Normal breath sounds.   Musculoskeletal:      Thoracic back: Spasms present. No swelling, edema, deformity, signs of trauma, lacerations, tenderness or bony tenderness. Decreased range of motion. No scoliosis.        Back:       Comments: Pain and spasms to right mid back.    Skin:     General: Skin is warm and dry.      Capillary Refill: Capillary refill takes less than 2 seconds.   Neurological:      Mental Status: She is alert and oriented to person, place, and time.   Psychiatric:         Mood and Affect: Mood normal.         Behavior: Behavior normal.         Thought Content: Thought content normal.         Judgment: Judgment normal.      "     No visits with results within 1 Day(s) from this visit.   Latest known visit with results is:   Orders Only on 11/28/2022   Component Date Value Ref Range Status    BCS Recommendation External 12/16/2021 Repeat mammogram in 1 year   Final      Assessment:       1. Back pain, unspecified back location, unspecified back pain laterality, unspecified chronicity    2. Muscle spasm        Plan:       Back pain, unspecified back location, unspecified back pain laterality, unspecified chronicity  -     ketorolac injection 60 mg  -     tiZANidine (ZANAFLEX) 4 MG tablet; Take 1 tablet (4 mg total) by mouth every 8 (eight) hours as needed (muscle spasms).  Dispense: 60 tablet; Refill: 1  -     predniSONE (DELTASONE) 20 MG tablet; Take 1 tablet (20 mg total) by mouth once daily. for 5 days  Dispense: 5 tablet; Refill: 0  -     HYDROcodone-acetaminophen (NORCO) 7.5-325 mg per tablet; Take 1 tablet by mouth every 6 (six) hours as needed for Pain (back pain).  Dispense: 12 tablet; Refill: 0  -     Ambulatory referral/consult to Pain Clinic; Future; Expected date: 03/17/2023    Muscle spasm  -     ketorolac injection 60 mg  -     tiZANidine (ZANAFLEX) 4 MG tablet; Take 1 tablet (4 mg total) by mouth every 8 (eight) hours as needed (muscle spasms).  Dispense: 60 tablet; Refill: 1  -     predniSONE (DELTASONE) 20 MG tablet; Take 1 tablet (20 mg total) by mouth once daily. for 5 days  Dispense: 5 tablet; Refill: 0  -     HYDROcodone-acetaminophen (NORCO) 7.5-325 mg per tablet; Take 1 tablet by mouth every 6 (six) hours as needed for Pain (back pain).  Dispense: 12 tablet; Refill: 0  -     Ambulatory referral/consult to Pain Clinic; Future; Expected date: 03/17/2023    Other orders  -     Discontinue: HYDROcodone-acetaminophen (NORCO) 7.5-325 mg per tablet; Take 1 tablet by mouth every 6 (six) hours as needed for Pain (back pain).  Dispense: 12 tablet; Refill: 0    Wants referral to pain management. Wants to see Dr. Arrington  F/U  with us if you have not heard about an appt. Give them a week to call you.   RTC PRN          Risks, benefits, and side effects were discussed with the patient. All questions were answered to the fullest satisfaction of the patient, and pt verbalized understanding and agreement to treatment plan. Pt was to call with any new or worsening symptoms, or present to the ER.

## 2023-03-11 NOTE — PROGRESS NOTES
Took BP meds and came back to clinic for Toradol and Decadron shot. Instructed to watch blood pressure and blood sugar closely. Drink plenty of water. RTC PRN

## 2023-03-14 ENCOUNTER — PATIENT OUTREACH (OUTPATIENT)
Dept: ADMINISTRATIVE | Facility: HOSPITAL | Age: 48
End: 2023-03-14

## 2023-03-14 NOTE — PROGRESS NOTES
POPULATION HEALTH-Non-compliant report chart audits/DIABETES. Chart review completed for  test overdue (mammograms, Colonoscopies, pap smears, DM labs, and/or EYE EXAMs)      Care Everywhere and media, updates requested and reviewed.      [facility] and [facility] reviewed    HM updated with external HEMOGLOBIN A1C AND LIPID PANEL REPORT report.

## 2023-05-05 DIAGNOSIS — E11.9 TYPE 2 DIABETES MELLITUS WITHOUT COMPLICATION, WITHOUT LONG-TERM CURRENT USE OF INSULIN: Primary | ICD-10-CM

## 2023-05-05 RX ORDER — SEMAGLUTIDE 2.68 MG/ML
2 INJECTION, SOLUTION SUBCUTANEOUS
Qty: 9 ML | Refills: 1 | Status: SHIPPED | OUTPATIENT
Start: 2023-05-05 | End: 2023-09-19

## 2023-05-26 RX ORDER — LEVOMILNACIPRAN HYDROCHLORIDE 80 MG/1
80 CAPSULE, EXTENDED RELEASE ORAL DAILY
Qty: 30 CAPSULE | Refills: 11 | Status: SHIPPED | OUTPATIENT
Start: 2023-05-26 | End: 2023-07-25 | Stop reason: DRUGHIGH

## 2023-07-25 ENCOUNTER — OFFICE VISIT (OUTPATIENT)
Dept: FAMILY MEDICINE | Facility: CLINIC | Age: 48
End: 2023-07-25
Payer: COMMERCIAL

## 2023-07-25 VITALS
OXYGEN SATURATION: 98 % | WEIGHT: 267 LBS | BODY MASS INDEX: 45.58 KG/M2 | SYSTOLIC BLOOD PRESSURE: 152 MMHG | RESPIRATION RATE: 20 BRPM | DIASTOLIC BLOOD PRESSURE: 97 MMHG | HEIGHT: 64 IN | HEART RATE: 78 BPM

## 2023-07-25 DIAGNOSIS — F41.9 ANXIETY: ICD-10-CM

## 2023-07-25 DIAGNOSIS — I10 PRIMARY HYPERTENSION: ICD-10-CM

## 2023-07-25 DIAGNOSIS — E11.9 TYPE 2 DIABETES MELLITUS WITHOUT COMPLICATION, WITHOUT LONG-TERM CURRENT USE OF INSULIN: ICD-10-CM

## 2023-07-25 DIAGNOSIS — F32.A DEPRESSION, UNSPECIFIED DEPRESSION TYPE: Primary | ICD-10-CM

## 2023-07-25 PROCEDURE — 3080F DIAST BP >= 90 MM HG: CPT | Mod: CPTII,,, | Performed by: FAMILY MEDICINE

## 2023-07-25 PROCEDURE — 3008F BODY MASS INDEX DOCD: CPT | Mod: CPTII,,, | Performed by: FAMILY MEDICINE

## 2023-07-25 PROCEDURE — 1160F RVW MEDS BY RX/DR IN RCRD: CPT | Mod: CPTII,,, | Performed by: FAMILY MEDICINE

## 2023-07-25 PROCEDURE — 4010F ACE/ARB THERAPY RXD/TAKEN: CPT | Mod: CPTII,,, | Performed by: FAMILY MEDICINE

## 2023-07-25 PROCEDURE — 1159F MED LIST DOCD IN RCRD: CPT | Mod: CPTII,,, | Performed by: FAMILY MEDICINE

## 2023-07-25 PROCEDURE — 1159F PR MEDICATION LIST DOCUMENTED IN MEDICAL RECORD: ICD-10-PCS | Mod: CPTII,,, | Performed by: FAMILY MEDICINE

## 2023-07-25 PROCEDURE — 99214 OFFICE O/P EST MOD 30 MIN: CPT | Mod: ,,, | Performed by: FAMILY MEDICINE

## 2023-07-25 PROCEDURE — 3077F SYST BP >= 140 MM HG: CPT | Mod: CPTII,,, | Performed by: FAMILY MEDICINE

## 2023-07-25 PROCEDURE — 3008F PR BODY MASS INDEX (BMI) DOCUMENTED: ICD-10-PCS | Mod: CPTII,,, | Performed by: FAMILY MEDICINE

## 2023-07-25 PROCEDURE — 99214 PR OFFICE/OUTPT VISIT, EST, LEVL IV, 30-39 MIN: ICD-10-PCS | Mod: ,,, | Performed by: FAMILY MEDICINE

## 2023-07-25 PROCEDURE — 1160F PR REVIEW ALL MEDS BY PRESCRIBER/CLIN PHARMACIST DOCUMENTED: ICD-10-PCS | Mod: CPTII,,, | Performed by: FAMILY MEDICINE

## 2023-07-25 PROCEDURE — 3077F PR MOST RECENT SYSTOLIC BLOOD PRESSURE >= 140 MM HG: ICD-10-PCS | Mod: CPTII,,, | Performed by: FAMILY MEDICINE

## 2023-07-25 PROCEDURE — 3080F PR MOST RECENT DIASTOLIC BLOOD PRESSURE >= 90 MM HG: ICD-10-PCS | Mod: CPTII,,, | Performed by: FAMILY MEDICINE

## 2023-07-25 PROCEDURE — 4010F PR ACE/ARB THEARPY RXD/TAKEN: ICD-10-PCS | Mod: CPTII,,, | Performed by: FAMILY MEDICINE

## 2023-07-25 RX ORDER — LEVOMILNACIPRAN HYDROCHLORIDE 120 MG/1
120 CAPSULE, EXTENDED RELEASE ORAL DAILY
Qty: 90 CAPSULE | Refills: 3 | Status: SHIPPED | OUTPATIENT
Start: 2023-07-25

## 2023-07-25 RX ORDER — SEMAGLUTIDE 1.34 MG/ML
1 INJECTION, SOLUTION SUBCUTANEOUS
Qty: 3 ML | Refills: 11 | Status: SHIPPED | OUTPATIENT
Start: 2023-07-25 | End: 2024-01-05 | Stop reason: SDUPTHER

## 2023-07-25 RX ORDER — ALPRAZOLAM 0.5 MG/1
0.5 TABLET ORAL 3 TIMES DAILY
Qty: 30 TABLET | Refills: 5 | Status: SHIPPED | OUTPATIENT
Start: 2023-07-25 | End: 2023-10-19

## 2023-07-25 NOTE — PROGRESS NOTES
Ezekiel Young MD        PATIENT NAME: Mimi Davis  : 1975  DATE: 23  MRN: [id number]      Billing Provider: Ezekiel Young MD  Level of Service: CA OFFICE/OUTPT VISIT, EST, LEVL IV, 30-39 MIN  Patient PCP Information       Provider PCP Type    Ella Tan MD General            Reason for Visit / Chief Complaint: Depression (Patient states she thinks she needs to increase her antidepressant, having crying spells ) and Anxiety       Update PCP  Update Chief Complaint         History of Present Illness / Problem Focused Workflow     Mimi Davis presents to the clinic with Depression (Patient states she thinks she needs to increase her antidepressant, having crying spells ) and Anxiety     Worsening depression.      DepressionPatient is not experiencing: confusion, decreased concentration, nervousness/anxiety and palpitations.      Anxiety  Patient reports no chest pain, confusion, decreased concentration, dizziness, nausea, nervous/anxious behavior or palpitations.         Review of Systems     Review of Systems   Constitutional:  Negative for activity change, appetite change, fever and unexpected weight change.   HENT:  Negative for congestion, rhinorrhea, sinus pressure, sinus pain, sore throat and trouble swallowing.    Eyes:  Negative for photophobia, pain, discharge and visual disturbance.   Respiratory:  Negative for cough, chest tightness, wheezing and stridor.    Cardiovascular:  Negative for chest pain, palpitations and leg swelling.   Gastrointestinal:  Negative for abdominal pain, blood in stool, constipation, diarrhea and nausea.   Endocrine: Negative for polydipsia, polyphagia and polyuria.   Genitourinary:  Negative for difficulty urinating, flank pain and hematuria.   Musculoskeletal:  Negative for arthralgias and neck pain.   Skin:  Negative for rash.   Allergic/Immunologic: Negative for food allergies.   Neurological:  Negative for dizziness, tremors, seizures,  syncope, weakness (global weakness) and headaches.   Psychiatric/Behavioral:  Positive for depression and dysphoric mood. Negative for behavioral problems, confusion, decreased concentration and hallucinations. The patient is not nervous/anxious.         Medical / Social / Family History     Past Medical History:   Diagnosis Date    Anemia     Anxiety     Depression     Diabetes mellitus, type 2     Encounter for long-term (current) use of other medications     GERD (gastroesophageal reflux disease)     Goiter, non-toxic     H/O echocardiogram 08/06/2013    EF 65%,MILD MR,TR,NC    Hx of screening mammography 12/16/2021    Hypertension     Low back pain     Mitral valve disorder     Papanicolaou smear 07/15/2013    Vitamin D deficiency        Past Surgical History:   Procedure Laterality Date    CHOLECYSTECTOMY      TUBAL LIGATION  2001       Social History  Ms.  reports that she has never smoked. She has never been exposed to tobacco smoke. She has never used smokeless tobacco. She reports that she does not drink alcohol and does not use drugs.    Family History  Ms.'s family history includes Diabetes in her mother; Hypertension in her mother; Kidney disease in her father; Mental illness in her brother and sister; Thyroid disease in her mother.    Medications and Allergies     Medications  No outpatient medications have been marked as taking for the 7/25/23 encounter (Office Visit) with Ezekiel Young MD.       Allergies  Review of patient's allergies indicates:  No Known Allergies    Physical Examination     Vitals:    07/25/23 1553   BP: (!) 152/97   Pulse: 78   Resp: 20     Physical Exam  Constitutional:       General: She is not in acute distress.     Appearance: Normal appearance.   HENT:      Head: Normocephalic.      Right Ear: Tympanic membrane and ear canal normal.      Left Ear: Tympanic membrane and ear canal normal.      Nose: Nose normal.      Mouth/Throat:      Mouth: Mucous membranes are moist.       Pharynx: No oropharyngeal exudate.   Eyes:      Extraocular Movements: Extraocular movements intact.      Pupils: Pupils are equal, round, and reactive to light.   Cardiovascular:      Rate and Rhythm: Normal rate and regular rhythm.      Heart sounds: No murmur heard.  Pulmonary:      Effort: Pulmonary effort is normal.      Breath sounds: Normal breath sounds. No wheezing.   Abdominal:      General: Abdomen is flat. Bowel sounds are normal.      Palpations: Abdomen is soft.      Hernia: No hernia is present.   Musculoskeletal:         General: Normal range of motion.      Cervical back: Normal range of motion and neck supple.      Right lower leg: No edema.      Left lower leg: No edema.   Lymphadenopathy:      Cervical: No cervical adenopathy.   Skin:     General: Skin is warm and dry.      Coloration: Skin is not jaundiced.      Findings: No lesion.   Neurological:      General: No focal deficit present.      Mental Status: She is alert and oriented to person, place, and time.      Cranial Nerves: No cranial nerve deficit.      Gait: Gait normal.   Psychiatric:         Mood and Affect: Mood normal.         Behavior: Behavior normal.         Judgment: Judgment normal.          Assessment and Plan (including Health Maintenance)      Problem List  Smart Sets  Document Outside HM   :    Plan:           Health Maintenance Due   Topic Date Due    Hepatitis C Screening  Never done    COVID-19 Vaccine (1) Never done    Diabetes Urine Screening  Never done    Pneumococcal Vaccines (Age 0-64) (1 - PCV) Never done    Foot Exam  Never done    Eye Exam  Never done    HIV Screening  Never done    TETANUS VACCINE  Never done    Colorectal Cancer Screening  Never done    Mammogram  12/16/2022    Hemoglobin A1c  04/28/2023       Problem List Items Addressed This Visit          Cardiac/Vascular    Hypertension       Endocrine    Diabetes mellitus, type 2    Relevant Medications    semaglutide (OZEMPIC) 1 mg/dose (4 mg/3 mL)      Other Visit Diagnoses       Depression, unspecified depression type    -  Primary    Relevant Medications    levomilnacipran (FETZIMA) 120 mg Cs24    Anxiety        Relevant Medications    ALPRAZolam (XANAX) 0.5 MG tablet            Health Maintenance Topics with due status: Not Due       Topic Last Completion Date    Lipid Panel 10/28/2022    Low Dose Statin 03/10/2023    Influenza Vaccine Not Due       No future appointments.     There are no Patient Instructions on file for this visit.  Follow up in about 4 weeks (around 8/22/2023) for routine followup.     Signature:  Ezekiel Young MD      Date of encounter: 7/25/23

## 2023-08-11 ENCOUNTER — LAB VISIT (OUTPATIENT)
Dept: LAB | Facility: CLINIC | Age: 48
End: 2023-08-11
Payer: COMMERCIAL

## 2023-08-11 ENCOUNTER — PATIENT OUTREACH (OUTPATIENT)
Dept: ADMINISTRATIVE | Facility: HOSPITAL | Age: 48
End: 2023-08-11

## 2023-08-11 DIAGNOSIS — R30.0 DYSURIA: Primary | ICD-10-CM

## 2023-08-11 LAB
BILIRUB UR QL STRIP: NEGATIVE
CLARITY UR: ABNORMAL
COLOR UR: YELLOW
GLUCOSE UR STRIP-MCNC: NORMAL MG/DL
KETONES UR STRIP-SCNC: NEGATIVE MG/DL
LEUKOCYTE ESTERASE UR QL STRIP: NEGATIVE
MUCOUS, UA: ABNORMAL /LPF
NITRITE UR QL STRIP: NEGATIVE
PH UR STRIP: 6 PH UNITS
PROT UR QL STRIP: 30
RBC # UR STRIP: NEGATIVE /UL
RBC #/AREA URNS HPF: <1 /HPF
SP GR UR STRIP: 1.03
SQUAMOUS #/AREA URNS LPF: ABNORMAL /HPF
UROBILINOGEN UR STRIP-ACNC: NORMAL MG/DL
WBC #/AREA URNS HPF: <5 /HPF

## 2023-08-11 PROCEDURE — 87086 URINE CULTURE/COLONY COUNT: CPT | Mod: ,,, | Performed by: CLINICAL MEDICAL LABORATORY

## 2023-08-11 PROCEDURE — 81001 URINALYSIS: ICD-10-PCS | Mod: ,,, | Performed by: CLINICAL MEDICAL LABORATORY

## 2023-08-11 PROCEDURE — 87086 CULTURE, URINE: ICD-10-PCS | Mod: ,,, | Performed by: CLINICAL MEDICAL LABORATORY

## 2023-08-11 PROCEDURE — 81001 URINALYSIS AUTO W/SCOPE: CPT | Mod: ,,, | Performed by: CLINICAL MEDICAL LABORATORY

## 2023-08-11 RX ORDER — NITROFURANTOIN 25; 75 MG/1; MG/1
100 CAPSULE ORAL 2 TIMES DAILY
Qty: 20 CAPSULE | Refills: 0 | Status: SHIPPED | OUTPATIENT
Start: 2023-08-11 | End: 2023-09-19

## 2023-08-11 NOTE — PROGRESS NOTES
Spoke with Patient via telephone regarding Blood Pressure Management. She states that blood pressure is still elevated. Scheduled Blood Pressure follow up for 08/28/23.

## 2023-08-13 LAB — UA COMPLETE W REFLEX CULTURE PNL UR: NORMAL

## 2023-09-19 ENCOUNTER — OFFICE VISIT (OUTPATIENT)
Dept: FAMILY MEDICINE | Facility: CLINIC | Age: 48
End: 2023-09-19
Payer: COMMERCIAL

## 2023-09-19 VITALS
OXYGEN SATURATION: 98 % | BODY MASS INDEX: 44.29 KG/M2 | DIASTOLIC BLOOD PRESSURE: 100 MMHG | HEART RATE: 82 BPM | TEMPERATURE: 99 F | SYSTOLIC BLOOD PRESSURE: 142 MMHG | WEIGHT: 265.81 LBS | HEIGHT: 65 IN | RESPIRATION RATE: 18 BRPM

## 2023-09-19 DIAGNOSIS — M79.674 GREAT TOE PAIN, RIGHT: ICD-10-CM

## 2023-09-19 DIAGNOSIS — M54.9 BACK PAIN, UNSPECIFIED BACK LOCATION, UNSPECIFIED BACK PAIN LATERALITY, UNSPECIFIED CHRONICITY: Primary | ICD-10-CM

## 2023-09-19 PROCEDURE — 1159F PR MEDICATION LIST DOCUMENTED IN MEDICAL RECORD: ICD-10-PCS | Mod: CPTII,,, | Performed by: NURSE PRACTITIONER

## 2023-09-19 PROCEDURE — 1160F RVW MEDS BY RX/DR IN RCRD: CPT | Mod: CPTII,,, | Performed by: NURSE PRACTITIONER

## 2023-09-19 PROCEDURE — 4010F ACE/ARB THERAPY RXD/TAKEN: CPT | Mod: CPTII,,, | Performed by: NURSE PRACTITIONER

## 2023-09-19 PROCEDURE — 3077F SYST BP >= 140 MM HG: CPT | Mod: CPTII,,, | Performed by: NURSE PRACTITIONER

## 2023-09-19 PROCEDURE — 4010F PR ACE/ARB THEARPY RXD/TAKEN: ICD-10-PCS | Mod: CPTII,,, | Performed by: NURSE PRACTITIONER

## 2023-09-19 PROCEDURE — 3080F PR MOST RECENT DIASTOLIC BLOOD PRESSURE >= 90 MM HG: ICD-10-PCS | Mod: CPTII,,, | Performed by: NURSE PRACTITIONER

## 2023-09-19 PROCEDURE — 99213 OFFICE O/P EST LOW 20 MIN: CPT | Mod: ,,, | Performed by: NURSE PRACTITIONER

## 2023-09-19 PROCEDURE — 1159F MED LIST DOCD IN RCRD: CPT | Mod: CPTII,,, | Performed by: NURSE PRACTITIONER

## 2023-09-19 PROCEDURE — 3008F PR BODY MASS INDEX (BMI) DOCUMENTED: ICD-10-PCS | Mod: CPTII,,, | Performed by: NURSE PRACTITIONER

## 2023-09-19 PROCEDURE — 3008F BODY MASS INDEX DOCD: CPT | Mod: CPTII,,, | Performed by: NURSE PRACTITIONER

## 2023-09-19 PROCEDURE — 3077F PR MOST RECENT SYSTOLIC BLOOD PRESSURE >= 140 MM HG: ICD-10-PCS | Mod: CPTII,,, | Performed by: NURSE PRACTITIONER

## 2023-09-19 PROCEDURE — 3080F DIAST BP >= 90 MM HG: CPT | Mod: CPTII,,, | Performed by: NURSE PRACTITIONER

## 2023-09-19 PROCEDURE — 99051 MED SERV EVE/WKEND/HOLIDAY: CPT | Mod: ,,, | Performed by: NURSE PRACTITIONER

## 2023-09-19 PROCEDURE — 99213 PR OFFICE/OUTPT VISIT, EST, LEVL III, 20-29 MIN: ICD-10-PCS | Mod: ,,, | Performed by: NURSE PRACTITIONER

## 2023-09-19 PROCEDURE — 1160F PR REVIEW ALL MEDS BY PRESCRIBER/CLIN PHARMACIST DOCUMENTED: ICD-10-PCS | Mod: CPTII,,, | Performed by: NURSE PRACTITIONER

## 2023-09-19 PROCEDURE — 99051 PR MEDICAL SERVICES, EVE/WKEND/HOLIDAY: ICD-10-PCS | Mod: ,,, | Performed by: NURSE PRACTITIONER

## 2023-09-19 RX ORDER — HYDROCODONE BITARTRATE AND ACETAMINOPHEN 7.5; 325 MG/1; MG/1
1 TABLET ORAL EVERY 6 HOURS PRN
Qty: 12 TABLET | Refills: 0 | Status: SHIPPED | OUTPATIENT
Start: 2023-09-19 | End: 2023-09-19

## 2023-09-19 RX ORDER — METHOCARBAMOL 500 MG/1
500 TABLET, FILM COATED ORAL 4 TIMES DAILY
Qty: 40 TABLET | Refills: 0 | Status: SHIPPED | OUTPATIENT
Start: 2023-09-19 | End: 2023-09-29

## 2023-09-19 RX ORDER — HYDROCODONE BITARTRATE AND ACETAMINOPHEN 7.5; 325 MG/1; MG/1
1 TABLET ORAL EVERY 6 HOURS PRN
Qty: 12 TABLET | Refills: 0 | Status: SHIPPED | OUTPATIENT
Start: 2023-09-19 | End: 2023-10-19

## 2023-09-19 RX ORDER — CARVEDILOL 25 MG/1
25 TABLET ORAL 2 TIMES DAILY
COMMUNITY
Start: 2023-09-10

## 2023-09-19 RX ORDER — HYDROCHLOROTHIAZIDE 25 MG/1
25 TABLET ORAL
COMMUNITY
Start: 2023-09-12

## 2023-09-19 RX ORDER — DOXAZOSIN 4 MG/1
2 TABLET ORAL 2 TIMES DAILY
COMMUNITY
Start: 2023-09-10

## 2023-09-19 NOTE — PROGRESS NOTES
"Subjective:       Patient ID: Mimi Davis is a 48 y.o. female.    Chief Complaint: Foot Pain (Right. Onset yeterday) and Back Pain (Onset 3 weeks ago)    Presents to clinic as above. No injury. Back pain is worse with movement. No radiation of pain. No numbness or tingling. No bladder or bowel issues. No leg weakness. Right great toe pain. No hx of gout. Has seen Dr. Arrington at his pain clinic in the past. But, wants to be referred to [facility]. States Mobic not helping. Had an MRI 2 weeks ago (lumbar) and states it showed "arthritis."       Review of Systems   Constitutional: Negative.    Respiratory: Negative.     Cardiovascular: Negative.    Gastrointestinal: Negative.    Genitourinary: Negative.    Musculoskeletal:  Positive for back pain and joint pain. Negative for falls.          Reviewed family, medical, surgical, and social history.    Objective:      BP (!) 142/100 (BP Location: Left arm, Patient Position: Sitting, BP Method: Large (Manual))   Pulse 82   Temp 98.7 °F (37.1 °C) (Oral)   Resp 18   Ht 5' 5" (1.651 m)   Wt 120.6 kg (265 lb 12.8 oz)   SpO2 98%   BMI 44.23 kg/m²   Physical Exam  Vitals and nursing note reviewed.   Constitutional:       General: She is not in acute distress.     Appearance: Normal appearance. She is not ill-appearing, toxic-appearing or diaphoretic.   HENT:      Head: Normocephalic.      Mouth/Throat:      Mouth: Mucous membranes are moist.   Cardiovascular:      Rate and Rhythm: Normal rate and regular rhythm.      Heart sounds: Normal heart sounds.   Pulmonary:      Effort: Pulmonary effort is normal.      Breath sounds: Normal breath sounds.   Musculoskeletal:      Cervical back: Normal range of motion and neck supple.      Lumbar back: Spasms present. No swelling, edema, deformity, signs of trauma, lacerations, tenderness or bony tenderness. Decreased range of motion. Negative right straight leg raise test and negative left straight leg raise test. No " scoliosis.      Left foot: Normal.      Comments: Pain with ROM lumbar spine. No leg weakness. Shailesh's test negative bilaterally. Mild swelling of right great toe. No redness or warmth.    Skin:     General: Skin is warm and dry.      Capillary Refill: Capillary refill takes less than 2 seconds.   Neurological:      Mental Status: She is alert and oriented to person, place, and time.   Psychiatric:         Mood and Affect: Mood normal.         Behavior: Behavior normal.         Thought Content: Thought content normal.         Judgment: Judgment normal.            No visits with results within 1 Day(s) from this visit.   Latest known visit with results is:   Lab Visit on 08/11/2023   Component Date Value Ref Range Status    Color, UA 08/11/2023 Yellow  Colorless, Straw, Yellow, Light Yellow, Dark Yellow Final    Clarity, UA 08/11/2023 Turbid  Clear Final    pH, UA 08/11/2023 6.0  5.0 to 8.0 pH Units Final    Leukocytes, UA 08/11/2023 Negative  Negative Final    Nitrites, UA 08/11/2023 Negative  Negative Final    Protein, UA 08/11/2023 30 (A)  Negative Final    Glucose, UA 08/11/2023 Normal  Normal mg/dL Final    Ketones, UA 08/11/2023 Negative  Negative mg/dL Final    Urobilinogen, UA 08/11/2023 Normal  0.2, 1.0, Normal mg/dL Final    Bilirubin, UA 08/11/2023 Negative  Negative Final    Blood, UA 08/11/2023 Negative  Negative Final    Specific Gravity, UA 08/11/2023 1.027  <=1.030 Final    Culture, Urine 08/11/2023 Skin/Urogenital Leonor Isolated, no further workup.   Final    WBC, UA 08/11/2023 <5  <=5 /hpf Final    RBC, UA 08/11/2023 <1  <=3 /hpf Final    Squamous Epithelial Cells, UA 08/11/2023 Many (A)  None Seen /HPF Final    Mucous 08/11/2023 Occasional (A)  None Seen /LPF Final      Assessment:       1. Back pain, unspecified back location, unspecified back pain laterality, unspecified chronicity    2. Great toe pain, right        Plan:       Back pain, unspecified back location, unspecified back pain  laterality, unspecified chronicity  -     Cancel: X-Ray Lumbar Spine AP And Lateral; Future; Expected date: 09/19/2023  -     Discontinue: HYDROcodone-acetaminophen (NORCO) 7.5-325 mg per tablet; Take 1 tablet by mouth every 6 (six) hours as needed for Pain (back pain). Do not take while taking alprazolam. These must be spaced at least 6 hours.  Dispense: 12 tablet; Refill: 0  -     methocarbamoL (ROBAXIN) 500 MG Tab; Take 1 tablet (500 mg total) by mouth 4 (four) times daily. for 10 days  Dispense: 40 tablet; Refill: 0  -     Ambulatory referral/consult to Pain Clinic; Future; Expected date: 09/26/2023  -     HYDROcodone-acetaminophen (NORCO) 7.5-325 mg per tablet; Take 1 tablet by mouth every 6 (six) hours as needed for Pain (back pain).  Dispense: 12 tablet; Refill: 0    Great toe pain, right  -     Cancel: X-Ray Toe 2 or More Views Left; Future; Expected date: 09/19/2023  -     X-Ray Toe 2 or More Views Right; Future; Expected date: 09/19/2023  -     Discontinue: HYDROcodone-acetaminophen (NORCO) 7.5-325 mg per tablet; Take 1 tablet by mouth every 6 (six) hours as needed for Pain (back pain). Do not take while taking alprazolam. These must be spaced at least 6 hours.  Dispense: 12 tablet; Refill: 0  -     methocarbamoL (ROBAXIN) 500 MG Tab; Take 1 tablet (500 mg total) by mouth 4 (four) times daily. for 10 days  Dispense: 40 tablet; Refill: 0  -     HYDROcodone-acetaminophen (NORCO) 7.5-325 mg per tablet; Take 1 tablet by mouth every 6 (six) hours as needed for Pain (back pain).  Dispense: 12 tablet; Refill: 0    Do not take Norco and Xanax on same day. States does not take it often  F/U with us in 1 week regarding referral if they have not called you  RTC PRN          Risks, benefits, and side effects were discussed with the patient. All questions were answered to the fullest satisfaction of the patient, and pt verbalized understanding and agreement to treatment plan. Pt was to call with any new or worsening  symptoms, or present to the ER.

## 2023-09-21 ENCOUNTER — TELEPHONE (OUTPATIENT)
Dept: FAMILY MEDICINE | Facility: CLINIC | Age: 48
End: 2023-09-21
Payer: COMMERCIAL

## 2023-09-21 NOTE — TELEPHONE ENCOUNTER
Notified patient of results. Verbalized understanding and said she would be waiting on a call from ortho.   ----- Message from BATOOL Llanes sent at 9/20/2023  4:47 PM CDT -----  Notify that radiologist says possible gout and a subchondral cyst. This is nothing alarming. I am referring to ortho.

## 2023-10-19 ENCOUNTER — OFFICE VISIT (OUTPATIENT)
Dept: FAMILY MEDICINE | Facility: CLINIC | Age: 48
End: 2023-10-19
Payer: COMMERCIAL

## 2023-10-19 VITALS
SYSTOLIC BLOOD PRESSURE: 128 MMHG | WEIGHT: 266.81 LBS | OXYGEN SATURATION: 96 % | DIASTOLIC BLOOD PRESSURE: 80 MMHG | TEMPERATURE: 98 F | HEIGHT: 66 IN | BODY MASS INDEX: 42.88 KG/M2 | HEART RATE: 77 BPM | RESPIRATION RATE: 16 BRPM

## 2023-10-19 DIAGNOSIS — R10.9 FLANK PAIN: ICD-10-CM

## 2023-10-19 DIAGNOSIS — M54.9 BACK PAIN, UNSPECIFIED BACK LOCATION, UNSPECIFIED BACK PAIN LATERALITY, UNSPECIFIED CHRONICITY: Primary | ICD-10-CM

## 2023-10-19 LAB
BILIRUB SERPL-MCNC: NEGATIVE MG/DL
BLOOD URINE, POC: NEGATIVE
COLOR, POC UA: YELLOW
GLUCOSE UR QL STRIP: NEGATIVE
KETONES UR QL STRIP: NEGATIVE
LEUKOCYTE ESTERASE URINE, POC: NEGATIVE
NITRITE, POC UA: NEGATIVE
PH, POC UA: 5.5
PROTEIN, POC: NEGATIVE
SPECIFIC GRAVITY, POC UA: >=1.03
UROBILINOGEN, POC UA: 0.2

## 2023-10-19 PROCEDURE — 3074F SYST BP LT 130 MM HG: CPT | Mod: CPTII,,, | Performed by: NURSE PRACTITIONER

## 2023-10-19 PROCEDURE — 87086 URINE CULTURE/COLONY COUNT: CPT | Mod: ,,, | Performed by: CLINICAL MEDICAL LABORATORY

## 2023-10-19 PROCEDURE — 87086 CULTURE, URINE: ICD-10-PCS | Mod: ,,, | Performed by: CLINICAL MEDICAL LABORATORY

## 2023-10-19 PROCEDURE — 96372 PR INJECTION,THERAP/PROPH/DIAG2ST, IM OR SUBCUT: ICD-10-PCS | Mod: ,,, | Performed by: NURSE PRACTITIONER

## 2023-10-19 PROCEDURE — 99051 PR MEDICAL SERVICES, EVE/WKEND/HOLIDAY: ICD-10-PCS | Mod: ,,, | Performed by: NURSE PRACTITIONER

## 2023-10-19 PROCEDURE — 3074F PR MOST RECENT SYSTOLIC BLOOD PRESSURE < 130 MM HG: ICD-10-PCS | Mod: CPTII,,, | Performed by: NURSE PRACTITIONER

## 2023-10-19 PROCEDURE — 99213 OFFICE O/P EST LOW 20 MIN: CPT | Mod: 25,,, | Performed by: NURSE PRACTITIONER

## 2023-10-19 PROCEDURE — 3079F PR MOST RECENT DIASTOLIC BLOOD PRESSURE 80-89 MM HG: ICD-10-PCS | Mod: CPTII,,, | Performed by: NURSE PRACTITIONER

## 2023-10-19 PROCEDURE — 3008F PR BODY MASS INDEX (BMI) DOCUMENTED: ICD-10-PCS | Mod: CPTII,,, | Performed by: NURSE PRACTITIONER

## 2023-10-19 PROCEDURE — 99213 PR OFFICE/OUTPT VISIT, EST, LEVL III, 20-29 MIN: ICD-10-PCS | Mod: 25,,, | Performed by: NURSE PRACTITIONER

## 2023-10-19 PROCEDURE — 4010F ACE/ARB THERAPY RXD/TAKEN: CPT | Mod: CPTII,,, | Performed by: NURSE PRACTITIONER

## 2023-10-19 PROCEDURE — 1160F RVW MEDS BY RX/DR IN RCRD: CPT | Mod: CPTII,,, | Performed by: NURSE PRACTITIONER

## 2023-10-19 PROCEDURE — 96372 THER/PROPH/DIAG INJ SC/IM: CPT | Mod: ,,, | Performed by: NURSE PRACTITIONER

## 2023-10-19 PROCEDURE — 3079F DIAST BP 80-89 MM HG: CPT | Mod: CPTII,,, | Performed by: NURSE PRACTITIONER

## 2023-10-19 PROCEDURE — 99051 MED SERV EVE/WKEND/HOLIDAY: CPT | Mod: ,,, | Performed by: NURSE PRACTITIONER

## 2023-10-19 PROCEDURE — 1160F PR REVIEW ALL MEDS BY PRESCRIBER/CLIN PHARMACIST DOCUMENTED: ICD-10-PCS | Mod: CPTII,,, | Performed by: NURSE PRACTITIONER

## 2023-10-19 PROCEDURE — 1159F PR MEDICATION LIST DOCUMENTED IN MEDICAL RECORD: ICD-10-PCS | Mod: CPTII,,, | Performed by: NURSE PRACTITIONER

## 2023-10-19 PROCEDURE — 1159F MED LIST DOCD IN RCRD: CPT | Mod: CPTII,,, | Performed by: NURSE PRACTITIONER

## 2023-10-19 PROCEDURE — 81003 URINALYSIS AUTO W/O SCOPE: CPT | Mod: QW,,, | Performed by: NURSE PRACTITIONER

## 2023-10-19 PROCEDURE — 4010F PR ACE/ARB THEARPY RXD/TAKEN: ICD-10-PCS | Mod: CPTII,,, | Performed by: NURSE PRACTITIONER

## 2023-10-19 PROCEDURE — 3008F BODY MASS INDEX DOCD: CPT | Mod: CPTII,,, | Performed by: NURSE PRACTITIONER

## 2023-10-19 PROCEDURE — 81003 POCT URINALYSIS W/O SCOPE: ICD-10-PCS | Mod: QW,,, | Performed by: NURSE PRACTITIONER

## 2023-10-19 RX ORDER — VALSARTAN 160 MG/1
160 TABLET ORAL
COMMUNITY
Start: 2023-10-04

## 2023-10-19 RX ORDER — KETOROLAC TROMETHAMINE 30 MG/ML
30 INJECTION, SOLUTION INTRAMUSCULAR; INTRAVENOUS
Status: COMPLETED | OUTPATIENT
Start: 2023-10-19 | End: 2023-10-19

## 2023-10-19 RX ORDER — DEXAMETHASONE SODIUM PHOSPHATE 4 MG/ML
4 INJECTION, SOLUTION INTRA-ARTICULAR; INTRALESIONAL; INTRAMUSCULAR; INTRAVENOUS; SOFT TISSUE
Status: COMPLETED | OUTPATIENT
Start: 2023-10-19 | End: 2023-10-19

## 2023-10-19 RX ADMIN — DEXAMETHASONE SODIUM PHOSPHATE 4 MG: 4 INJECTION, SOLUTION INTRA-ARTICULAR; INTRALESIONAL; INTRAMUSCULAR; INTRAVENOUS; SOFT TISSUE at 07:10

## 2023-10-19 RX ADMIN — KETOROLAC TROMETHAMINE 30 MG: 30 INJECTION, SOLUTION INTRAMUSCULAR; INTRAVENOUS at 07:10

## 2023-10-20 NOTE — PROGRESS NOTES
"Subjective:       Patient ID: Mimi Davis is a 48 y.o. female.    Chief Complaint: Back Pain (Stepped down wrong off of a curb on tuesday)    Presents to clinic as above. Has pain in flank area and upper back. Pain worse with movement. Also worse when takes a deep breath. No leg weakness. No saddle anesthesia s/s. Has not gotten appt with pain clinic yet.       Review of Systems   Constitutional: Negative.    Respiratory: Negative.     Cardiovascular: Negative.    Gastrointestinal: Negative.    Genitourinary:  Positive for flank pain. Negative for dysuria, frequency, hematuria and urgency.   Musculoskeletal:  Positive for back pain.          Reviewed family, medical, surgical, and social history.    Objective:      /80 (BP Location: Right arm, Patient Position: Sitting, BP Method: Large (Manual))   Pulse 77   Temp 98.3 °F (36.8 °C) (Oral)   Resp 16   Ht 5' 6" (1.676 m)   Wt 121 kg (266 lb 12.8 oz)   SpO2 96%   BMI 43.06 kg/m²   Physical Exam  Vitals and nursing note reviewed.   Constitutional:       General: She is not in acute distress.     Appearance: Normal appearance. She is obese. She is not ill-appearing, toxic-appearing or diaphoretic.   Cardiovascular:      Rate and Rhythm: Normal rate and regular rhythm.      Heart sounds: Normal heart sounds.   Pulmonary:      Effort: Pulmonary effort is normal.      Breath sounds: Normal breath sounds.   Abdominal:      Tenderness: There is no abdominal tenderness. There is no right CVA tenderness or left CVA tenderness.   Musculoskeletal:      Cervical back: Normal range of motion and neck supple.      Thoracic back: No swelling, edema, deformity, signs of trauma, lacerations, spasms, tenderness or bony tenderness. Decreased range of motion. No scoliosis.      Lumbar back: No swelling, edema, deformity, signs of trauma, lacerations, spasms, tenderness or bony tenderness. Decreased range of motion. Negative right straight leg raise test and negative left " straight leg raise test. No scoliosis.        Back:    Skin:     General: Skin is warm and dry.      Capillary Refill: Capillary refill takes less than 2 seconds.   Neurological:      Mental Status: She is alert and oriented to person, place, and time.   Psychiatric:         Mood and Affect: Mood normal.         Behavior: Behavior normal.         Thought Content: Thought content normal.         Judgment: Judgment normal.            Office Visit on 10/19/2023   Component Date Value Ref Range Status    Color, UA 10/19/2023 Yellow   Final    Spec Grav UA 10/19/2023 >=1.030   Final    pH, UA 10/19/2023 5.5   Final    WBC, UA 10/19/2023 negative   Final    Nitrite, UA 10/19/2023 negative   Final    Protein, POC 10/19/2023 negative   Final    Glucose, UA 10/19/2023 negative   Final    Ketones, UA 10/19/2023 negative   Final    Bilirubin, POC 10/19/2023 negative   Final    Urobilinogen, UA 10/19/2023 0.2   Final    Blood, UA 10/19/2023 negative   Final      Assessment:       1. Back pain, unspecified back location, unspecified back pain laterality, unspecified chronicity    2. Flank pain        Plan:       Back pain, unspecified back location, unspecified back pain laterality, unspecified chronicity  -     Urine culture; Future; Expected date: 10/19/2023  -     POCT URINALYSIS W/O SCOPE  -     dexAMETHasone injection 4 mg  -     ketorolac injection 30 mg    Flank pain  -     Urine culture; Future; Expected date: 10/19/2023  -     POCT URINALYSIS W/O SCOPE  -     dexAMETHasone injection 4 mg  -     ketorolac injection 30 mg          F/U with referral center regarding referral   RTC PRN      Risks, benefits, and side effects were discussed with the patient. All questions were answered to the fullest satisfaction of the patient, and pt verbalized understanding and agreement to treatment plan. Pt was to call with any new or worsening symptoms, or present to the ER.

## 2023-10-21 LAB — UA COMPLETE W REFLEX CULTURE PNL UR: NORMAL

## 2023-10-25 NOTE — H&P (VIEW-ONLY)
Subjective:         Patient ID: Mimi Davis is a 48 y.o. female.    Chief Complaint: Low-back Pain and Mid-back Pain      Pain  This is a chronic problem. The current episode started more than 1 year ago. The problem occurs daily. The problem has been waxing and waning. Associated symptoms include arthralgias. Pertinent negatives include no anorexia, change in bowel habit, chest pain, chills, coughing, diaphoresis, fever, neck pain, rash, sore throat, swollen glands, urinary symptoms, vertigo or vomiting.     Review of Systems   Constitutional:  Negative for activity change, appetite change, chills, diaphoresis, fever and unexpected weight change.   HENT:  Negative for drooling, ear discharge, ear pain, facial swelling, nosebleeds, sore throat, trouble swallowing, voice change and goiter.    Eyes:  Negative for photophobia, pain, discharge, redness and visual disturbance.   Respiratory:  Negative for apnea, cough, choking, chest tightness, shortness of breath, wheezing and stridor.    Cardiovascular:  Negative for chest pain, palpitations and leg swelling.   Gastrointestinal:  Negative for abdominal distention, anorexia, change in bowel habit, diarrhea, rectal pain, vomiting and fecal incontinence.   Endocrine: Negative for cold intolerance, heat intolerance, polydipsia, polyphagia and polyuria.   Genitourinary:  Negative for bladder incontinence, dysuria, flank pain, frequency and hot flashes.   Musculoskeletal:  Positive for arthralgias, back pain and leg pain. Negative for neck pain.   Integumentary:  Negative for color change, pallor and rash.   Allergic/Immunologic: Negative for immunocompromised state.   Neurological:  Negative for dizziness, vertigo, seizures, syncope, facial asymmetry, speech difficulty, light-headedness, memory loss and coordination difficulties.   Hematological:  Negative for adenopathy. Does not bruise/bleed easily.   Psychiatric/Behavioral:  Negative for agitation, behavioral  "problems, confusion, decreased concentration, dysphoric mood, hallucinations, self-injury and suicidal ideas. The patient is not nervous/anxious and is not hyperactive.            Past Medical History:   Diagnosis Date    Anemia     Anxiety     Depression     Diabetes mellitus, type 2     Encounter for long-term (current) use of other medications     GERD (gastroesophageal reflux disease)     Goiter, non-toxic     H/O echocardiogram 08/06/2013    EF 65%,MILD MR,TR,MA    Hx of screening mammography 12/16/2021    Hypertension     Low back pain     Mitral valve disorder     Papanicolaou smear 07/15/2013    Vitamin D deficiency      Past Surgical History:   Procedure Laterality Date    CHOLECYSTECTOMY      TUBAL LIGATION  2001     Social History     Socioeconomic History    Marital status:      Spouse name: Nathaniel    Number of children: 6   Occupational History    Occupation: [employer]   Tobacco Use    Smoking status: Never     Passive exposure: Never    Smokeless tobacco: Never   Substance and Sexual Activity    Alcohol use: Never    Drug use: Never    Sexual activity: Yes     Partners: Male     Family History   Problem Relation Age of Onset    Diabetes Mother     Hypertension Mother     Thyroid disease Mother     Kidney disease Father     Mental illness Sister     Mental illness Brother      Review of patient's allergies indicates:  No Known Allergies     Objective:  Vitals:    10/26/23 0825   Resp: 18   Weight: 119.7 kg (264 lb)   Height: 5' 6" (1.676 m)   PainSc:   9         Physical Exam  Vitals and nursing note reviewed. Exam conducted with a chaperone present.   Constitutional:       General: She is awake. She is not in acute distress.     Appearance: Normal appearance. She is not ill-appearing, toxic-appearing or diaphoretic.   HENT:      Head: Normocephalic and atraumatic.      Nose: Nose normal.      Mouth/Throat:      Mouth: Mucous membranes are moist.      Pharynx: Oropharynx is clear.   Eyes:      " Conjunctiva/sclera: Conjunctivae normal.      Pupils: Pupils are equal, round, and reactive to light.   Cardiovascular:      Rate and Rhythm: Normal rate.   Pulmonary:      Effort: Pulmonary effort is normal. No respiratory distress.   Abdominal:      Palpations: Abdomen is soft.      Tenderness: There is no guarding.   Musculoskeletal:         General: Normal range of motion.      Cervical back: Normal range of motion and neck supple. No rigidity.   Skin:     General: Skin is warm and dry.      Coloration: Skin is not jaundiced or pale.   Neurological:      General: No focal deficit present.      Mental Status: She is alert and oriented to person, place, and time. Mental status is at baseline.      Cranial Nerves: No cranial nerve deficit (II-XII).   Psychiatric:         Mood and Affect: Mood normal.         Behavior: Behavior normal. Behavior is cooperative.         Thought Content: Thought content normal.           X-Ray Thoracic Spine AP Lateral  Narrative: EXAMINATION:  XR THORACIC SPINE AP LATERAL    CLINICAL HISTORY:  Spondylosis without myelopathy or radiculopathy, thoracic region    COMPARISON:  None.    TECHNIQUE:  XR THORACIC SPINE AP LATERAL    FINDINGS:  No fracture is seen. Vertebral body heights and alignment are normal.  The disc space heights are well-maintained.  Hypertrophic osteophytes with bridging present at multiple levels, similar to previous.  No other degenerative change is present.  Impression: Hypertrophic osteophytes with bridging at multiple levels typical of diffuse idiopathic skeletal hyperostosis.  No other abnormality.    Electronically signed by: Kayden Sosa  Date:    10/26/2023  Time:    09:46       Office Visit on 10/19/2023   Component Date Value Ref Range Status    Color, UA 10/19/2023 Yellow   Final    Spec Grav UA 10/19/2023 >=1.030   Final    pH, UA 10/19/2023 5.5   Final    WBC, UA 10/19/2023 negative   Final    Nitrite, UA 10/19/2023 negative   Final    Protein, POC  10/19/2023 negative   Final    Glucose, UA 10/19/2023 negative   Final    Ketones, UA 10/19/2023 negative   Final    Bilirubin, POC 10/19/2023 negative   Final    Urobilinogen, UA 10/19/2023 0.2   Final    Blood, UA 10/19/2023 negative   Final    Culture, Urine 10/19/2023 Mixed Skin/Urogenital Leonor Isolated, no further workup.   Final   Lab Visit on 08/11/2023   Component Date Value Ref Range Status    Color, UA 08/11/2023 Yellow  Colorless, Straw, Yellow, Light Yellow, Dark Yellow Final    Clarity, UA 08/11/2023 Turbid  Clear Final    pH, UA 08/11/2023 6.0  5.0 to 8.0 pH Units Final    Leukocytes, UA 08/11/2023 Negative  Negative Final    Nitrites, UA 08/11/2023 Negative  Negative Final    Protein, UA 08/11/2023 30 (A)  Negative Final    Glucose, UA 08/11/2023 Normal  Normal mg/dL Final    Ketones, UA 08/11/2023 Negative  Negative mg/dL Final    Urobilinogen, UA 08/11/2023 Normal  0.2, 1.0, Normal mg/dL Final    Bilirubin, UA 08/11/2023 Negative  Negative Final    Blood, UA 08/11/2023 Negative  Negative Final    Specific Gravity, UA 08/11/2023 1.027  <=1.030 Final    Culture, Urine 08/11/2023 Skin/Urogenital Leonor Isolated, no further workup.   Final    WBC, UA 08/11/2023 <5  <=5 /hpf Final    RBC, UA 08/11/2023 <1  <=3 /hpf Final    Squamous Epithelial Cells, UA 08/11/2023 Many (A)  None Seen /HPF Final    Mucous 08/11/2023 Occasional (A)  None Seen /LPF Final         Orders Placed This Encounter   Procedures    X-Ray Thoracic Spine AP Lateral     Standing Status:   Future     Number of Occurrences:   1     Standing Expiration Date:   10/26/2024     Order Specific Question:   Does the patient have a neck collar or brace on?     Answer:   No     Order Specific Question:   May the Radiologist modify the order per protocol to meet the clinical needs of the patient?     Answer:   Yes     Order Specific Question:   Is the patient pregnant?     Answer:   No     Order Specific Question:   Release to patient     Answer:    Immediate    Drug Screen Definitive 14, Urine     Standing Status:   Future     Number of Occurrences:   1     Standing Expiration Date:   12/24/2024     Order Specific Question:   Specimen Source     Answer:   Urine    CBC Auto Differential     Standing Status:   Future     Number of Occurrences:   1     Standing Expiration Date:   1/26/2024    Comprehensive Metabolic Panel     Standing Status:   Future     Number of Occurrences:   1     Standing Expiration Date:   1/26/2024    Rheumatoid Quantitative     Standing Status:   Future     Number of Occurrences:   1     Standing Expiration Date:   1/26/2024    HLA-B27 antigen     Standing Status:   Future     Number of Occurrences:   1     Standing Expiration Date:   1/26/2024    C-Reactive Protein     Standing Status:   Future     Number of Occurrences:   1     Standing Expiration Date:   1/26/2024    Sedimentation Rate     Standing Status:   Future     Number of Occurrences:   1     Standing Expiration Date:   1/26/2024    Treponema Pallidum (Syphillis) Antibody     Standing Status:   Future     Number of Occurrences:   1     Standing Expiration Date:   1/26/2024    T4, Free     Standing Status:   Future     Number of Occurrences:   1     Standing Expiration Date:   1/26/2024    TSH     Standing Status:   Future     Number of Occurrences:   1     Standing Expiration Date:   1/26/2024    Lupus Anticoagulant Eval w/Reflex     Standing Status:   Future     Number of Occurrences:   1     Standing Expiration Date:   1/26/2024    Vitamin D     Standing Status:   Future     Number of Occurrences:   1     Standing Expiration Date:   1/26/2024    Hepatitis C Antibody     Standing Status:   Future     Number of Occurrences:   1     Standing Expiration Date:   1/26/2024     Order Specific Question:   Release to patient     Answer:   Immediate    Uric Acid     Standing Status:   Future     Number of Occurrences:   1     Standing Expiration Date:   1/26/2024    Antistreptolysin O  (ASO)     Standing Status:   Future     Number of Occurrences:   1     Standing Expiration Date:   1/26/2024    Anti-DNA Ab, Double-Stranded     Standing Status:   Future     Number of Occurrences:   1     Standing Expiration Date:   1/26/2024    TRACEY EIA w/ Reflex to dsDNA/ALLAN     Standing Status:   Future     Number of Occurrences:   1     Standing Expiration Date:   1/26/2024    C3 Complement     Standing Status:   Future     Number of Occurrences:   1     Standing Expiration Date:   12/26/2023    C4 Complement     Standing Status:   Future     Number of Occurrences:   1     Standing Expiration Date:   12/26/2023    CK     Standing Status:   Future     Number of Occurrences:   1     Standing Expiration Date:   1/26/2024    Complement, Total     Standing Status:   Future     Number of Occurrences:   1     Standing Expiration Date:   12/26/2023    Cyclic Citrullinated Peptide Antibody, IgG     Standing Status:   Future     Number of Occurrences:   1     Standing Expiration Date:   12/24/2024    Quantiferon Gold TB     Standing Status:   Future     Number of Occurrences:   1     Standing Expiration Date:   12/24/2024    Ehrlichia Antibody Panel     Standing Status:   Future     Number of Occurrences:   1     Standing Expiration Date:   1/26/2024    Spotted Fever Group Antibodies     Standing Status:   Future     Number of Occurrences:   1     Standing Expiration Date:   1/26/2024    Lyme Antibody w/ Immunoblot Reflex     Standing Status:   Future     Number of Occurrences:   1     Standing Expiration Date:   1/26/2024    POCT Urine Drug Screen Presump     Interpretive Information:     Negative:  No drug detected at the cut off level.   Positive:  This result represents presumptive positive for the   tested drug, other substances may yield a positive response other   than the analyte of interest. This result should be utilized for   diagnostic purpose only. Confirmation testing will be performed upon physician request  only.       Case Request Operating Room: Thoacic Medial Branch Block T8-10     Order Specific Question:   Medical Necessity:     Answer:   Medically Non-Urgent [100]     Order Specific Question:   CPT Code:     Answer:   MI INJ DX/THER AGNT PARAVERT FACET JOINT,IMG GUIDE,CERV/THORAC, 1ST LEVEL [98396]     Order Specific Question:   CPT Code:     Answer:   MI INJ DX/THER AGNT PARAVERT FACET JOINT,IMG GUIDE,CERV/THORAC, 2ND LEVEL [31330]     Order Specific Question:   CPT Code:     Answer:   MI INJ DX/THER AGNT PARAVERT FACET JOINT,IMG GUIDE,CERV/THORAC, ADD LEVEL [11336]     Order Specific Question:   Is an on-site pathologist required for this procedure?     Answer:   N/A       Requested Prescriptions     Signed Prescriptions Disp Refills    ergocalciferol (VITAMIN D2) 50,000 unit Cap 8 capsule 0     Sig: Take 1 capsule (50,000 Units total) by mouth every 7 days. for 8 doses       Assessment:     1. Thoracic spondylosis    2. Back pain, unspecified back location, unspecified back pain laterality, unspecified chronicity    3. Encounter for long-term (current) use of other medications    4. Myalgia    5. Polyarthralgia    6. Vitamin D deficiency         A's of Opioid Risk Assessment  Activity:Patient can perform ADL.   Analgesia:Patients pain is partially controlled by current medication. Patient has tried OTC medications such as Tylenol and Ibuprofen with out relief.   Adverse Effects: Patient denies constipation or sedation.  Aberrant Behavior:  reviewed with no aberrant drug seeking/taking behavior.  Overdose reversal drug naloxone discussed    Drug screen reviewed      Plan:    October 26, 2023 vitamin-D level 21.5 will start vitamin-D replacement patient notified        New patient     Patient request call only with procedures    Zeinab Yang NP    Midthoracic pain, lower back pain ongoing for more than 3 months greater than 10 years, she states approximately 6 weeks ago pain flared up after stepping off of a  sidewalk sharp stabbing pain worse with flexion extension rotation thoracic and lumbar spine ongoing for more than 3 months    She denies loss of bowel or bladder function denies weakness difficulty ambulating    [facility]  Was not happy after this visit    Benzodiazepine Xanax 0.5 mg primary care provider    Labs reviewed through October 19, 2023  TSH November 18, 2021 3.1  Hemoglobin A1c 7.4    She states she is seen chiropractic treatment in the past did not improve her discomfort     She continues her home exercise program ongoing for more than a year 3 days a week 30-45 minutes per session not helping with her discomfort     Anti-inflammatories ibuprofen, naproxen sodium have not improve her discomfort    Requesting procedure for midthoracic pain     Requesting evaluation polyarthralgia myalgia     X-ray thoracic spine today    Will obtain records from previous pain treatment Center     MRI thoracic spine [facility] dated April 14, 2023, multiple level degenerative changes    X-ray thoracic spine [facility] dated April 14, 2023 degenerative changes no fracture noted    X-ray lumbar spine April 14, 2023 degenerative changes no fracture noted    Indications for this procedure for this specific patient include the following   - Pt has had symptoms for three months with moderate to severe pain with functional impairment rated of 7/10 pain. Pain is severe enough to affect her quality of life and function  - Pain non-responsive to conservative care.    - Pain predominately axial and not associated with radiculopathy or claudication.    - No non-facet pathology as source of pain.    - Clinical assessment implicates facet joint as putative pain source.    - facet loading maneuver positive  - Pain is exacerbated by extension or prolonged sitting/standing and relieved by rest.    - No unexplained neurologic deficit.    - No history of coagulopathy,  infection or unstable medical conditions.    - Pain is causing significant functional limitation resulting in diminished quality of life and impaired age appropriate ADL's.   - Clinical assessment implicates facet joint as putative source of pain  - Repeat injections not done prior to 7 days   - no more than 2 levels will be done    -procedure done prior to surgical consideration  The planned medically necessary  surgical procedure is performed in a hospital outpatient department and not in an ambulatory surgical center due to:     -there is no geographically assessable ambulatory surgery center that has the  necessary equipment and fluoroscopy needed for the procedure     -there is no geographically assessable ambulatory surgical center available at which the physician has privileges     -an ASC's  specific  guideline regarding the individuals weight or health conditions that prevent the use of an ASC     -Medial branch block performed in consideration for RFTC, not just for therapeutic treatment    -done under fluoro    Local only,  patient request    Thoracic medial branch block T8 through 10 bilateral, # 1, thoracic spondylosis    Dr. Gomez    Bring original prescription medication bottles/container/box with labels to each visit

## 2023-10-25 NOTE — PROGRESS NOTES
Subjective:         Patient ID: Mimi Davis is a 48 y.o. female.    Chief Complaint: Low-back Pain and Mid-back Pain      Pain  This is a chronic problem. The current episode started more than 1 year ago. The problem occurs daily. The problem has been waxing and waning. Associated symptoms include arthralgias. Pertinent negatives include no anorexia, change in bowel habit, chest pain, chills, coughing, diaphoresis, fever, neck pain, rash, sore throat, swollen glands, urinary symptoms, vertigo or vomiting.     Review of Systems   Constitutional:  Negative for activity change, appetite change, chills, diaphoresis, fever and unexpected weight change.   HENT:  Negative for drooling, ear discharge, ear pain, facial swelling, nosebleeds, sore throat, trouble swallowing, voice change and goiter.    Eyes:  Negative for photophobia, pain, discharge, redness and visual disturbance.   Respiratory:  Negative for apnea, cough, choking, chest tightness, shortness of breath, wheezing and stridor.    Cardiovascular:  Negative for chest pain, palpitations and leg swelling.   Gastrointestinal:  Negative for abdominal distention, anorexia, change in bowel habit, diarrhea, rectal pain, vomiting and fecal incontinence.   Endocrine: Negative for cold intolerance, heat intolerance, polydipsia, polyphagia and polyuria.   Genitourinary:  Negative for bladder incontinence, dysuria, flank pain, frequency and hot flashes.   Musculoskeletal:  Positive for arthralgias, back pain and leg pain. Negative for neck pain.   Integumentary:  Negative for color change, pallor and rash.   Allergic/Immunologic: Negative for immunocompromised state.   Neurological:  Negative for dizziness, vertigo, seizures, syncope, facial asymmetry, speech difficulty, light-headedness, memory loss and coordination difficulties.   Hematological:  Negative for adenopathy. Does not bruise/bleed easily.   Psychiatric/Behavioral:  Negative for agitation, behavioral  "problems, confusion, decreased concentration, dysphoric mood, hallucinations, self-injury and suicidal ideas. The patient is not nervous/anxious and is not hyperactive.            Past Medical History:   Diagnosis Date    Anemia     Anxiety     Depression     Diabetes mellitus, type 2     Encounter for long-term (current) use of other medications     GERD (gastroesophageal reflux disease)     Goiter, non-toxic     H/O echocardiogram 08/06/2013    EF 65%,MILD MR,TR,OR    Hx of screening mammography 12/16/2021    Hypertension     Low back pain     Mitral valve disorder     Papanicolaou smear 07/15/2013    Vitamin D deficiency      Past Surgical History:   Procedure Laterality Date    CHOLECYSTECTOMY      TUBAL LIGATION  2001     Social History     Socioeconomic History    Marital status:      Spouse name: Nathaniel    Number of children: 6   Occupational History    Occupation: [employer]   Tobacco Use    Smoking status: Never     Passive exposure: Never    Smokeless tobacco: Never   Substance and Sexual Activity    Alcohol use: Never    Drug use: Never    Sexual activity: Yes     Partners: Male     Family History   Problem Relation Age of Onset    Diabetes Mother     Hypertension Mother     Thyroid disease Mother     Kidney disease Father     Mental illness Sister     Mental illness Brother      Review of patient's allergies indicates:  No Known Allergies     Objective:  Vitals:    10/26/23 0825   Resp: 18   Weight: 119.7 kg (264 lb)   Height: 5' 6" (1.676 m)   PainSc:   9         Physical Exam  Vitals and nursing note reviewed. Exam conducted with a chaperone present.   Constitutional:       General: She is awake. She is not in acute distress.     Appearance: Normal appearance. She is not ill-appearing, toxic-appearing or diaphoretic.   HENT:      Head: Normocephalic and atraumatic.      Nose: Nose normal.      Mouth/Throat:      Mouth: Mucous membranes are moist.      Pharynx: Oropharynx is clear.   Eyes:      " Conjunctiva/sclera: Conjunctivae normal.      Pupils: Pupils are equal, round, and reactive to light.   Cardiovascular:      Rate and Rhythm: Normal rate.   Pulmonary:      Effort: Pulmonary effort is normal. No respiratory distress.   Abdominal:      Palpations: Abdomen is soft.      Tenderness: There is no guarding.   Musculoskeletal:         General: Normal range of motion.      Cervical back: Normal range of motion and neck supple. No rigidity.   Skin:     General: Skin is warm and dry.      Coloration: Skin is not jaundiced or pale.   Neurological:      General: No focal deficit present.      Mental Status: She is alert and oriented to person, place, and time. Mental status is at baseline.      Cranial Nerves: No cranial nerve deficit (II-XII).   Psychiatric:         Mood and Affect: Mood normal.         Behavior: Behavior normal. Behavior is cooperative.         Thought Content: Thought content normal.           X-Ray Thoracic Spine AP Lateral  Narrative: EXAMINATION:  XR THORACIC SPINE AP LATERAL    CLINICAL HISTORY:  Spondylosis without myelopathy or radiculopathy, thoracic region    COMPARISON:  None.    TECHNIQUE:  XR THORACIC SPINE AP LATERAL    FINDINGS:  No fracture is seen. Vertebral body heights and alignment are normal.  The disc space heights are well-maintained.  Hypertrophic osteophytes with bridging present at multiple levels, similar to previous.  No other degenerative change is present.  Impression: Hypertrophic osteophytes with bridging at multiple levels typical of diffuse idiopathic skeletal hyperostosis.  No other abnormality.    Electronically signed by: Kayden Sosa  Date:    10/26/2023  Time:    09:46       Office Visit on 10/19/2023   Component Date Value Ref Range Status    Color, UA 10/19/2023 Yellow   Final    Spec Grav UA 10/19/2023 >=1.030   Final    pH, UA 10/19/2023 5.5   Final    WBC, UA 10/19/2023 negative   Final    Nitrite, UA 10/19/2023 negative   Final    Protein, POC  10/19/2023 negative   Final    Glucose, UA 10/19/2023 negative   Final    Ketones, UA 10/19/2023 negative   Final    Bilirubin, POC 10/19/2023 negative   Final    Urobilinogen, UA 10/19/2023 0.2   Final    Blood, UA 10/19/2023 negative   Final    Culture, Urine 10/19/2023 Mixed Skin/Urogenital Leonor Isolated, no further workup.   Final   Lab Visit on 08/11/2023   Component Date Value Ref Range Status    Color, UA 08/11/2023 Yellow  Colorless, Straw, Yellow, Light Yellow, Dark Yellow Final    Clarity, UA 08/11/2023 Turbid  Clear Final    pH, UA 08/11/2023 6.0  5.0 to 8.0 pH Units Final    Leukocytes, UA 08/11/2023 Negative  Negative Final    Nitrites, UA 08/11/2023 Negative  Negative Final    Protein, UA 08/11/2023 30 (A)  Negative Final    Glucose, UA 08/11/2023 Normal  Normal mg/dL Final    Ketones, UA 08/11/2023 Negative  Negative mg/dL Final    Urobilinogen, UA 08/11/2023 Normal  0.2, 1.0, Normal mg/dL Final    Bilirubin, UA 08/11/2023 Negative  Negative Final    Blood, UA 08/11/2023 Negative  Negative Final    Specific Gravity, UA 08/11/2023 1.027  <=1.030 Final    Culture, Urine 08/11/2023 Skin/Urogenital Leonor Isolated, no further workup.   Final    WBC, UA 08/11/2023 <5  <=5 /hpf Final    RBC, UA 08/11/2023 <1  <=3 /hpf Final    Squamous Epithelial Cells, UA 08/11/2023 Many (A)  None Seen /HPF Final    Mucous 08/11/2023 Occasional (A)  None Seen /LPF Final         Orders Placed This Encounter   Procedures    X-Ray Thoracic Spine AP Lateral     Standing Status:   Future     Number of Occurrences:   1     Standing Expiration Date:   10/26/2024     Order Specific Question:   Does the patient have a neck collar or brace on?     Answer:   No     Order Specific Question:   May the Radiologist modify the order per protocol to meet the clinical needs of the patient?     Answer:   Yes     Order Specific Question:   Is the patient pregnant?     Answer:   No     Order Specific Question:   Release to patient     Answer:    Immediate    Drug Screen Definitive 14, Urine     Standing Status:   Future     Number of Occurrences:   1     Standing Expiration Date:   12/24/2024     Order Specific Question:   Specimen Source     Answer:   Urine    CBC Auto Differential     Standing Status:   Future     Number of Occurrences:   1     Standing Expiration Date:   1/26/2024    Comprehensive Metabolic Panel     Standing Status:   Future     Number of Occurrences:   1     Standing Expiration Date:   1/26/2024    Rheumatoid Quantitative     Standing Status:   Future     Number of Occurrences:   1     Standing Expiration Date:   1/26/2024    HLA-B27 antigen     Standing Status:   Future     Number of Occurrences:   1     Standing Expiration Date:   1/26/2024    C-Reactive Protein     Standing Status:   Future     Number of Occurrences:   1     Standing Expiration Date:   1/26/2024    Sedimentation Rate     Standing Status:   Future     Number of Occurrences:   1     Standing Expiration Date:   1/26/2024    Treponema Pallidum (Syphillis) Antibody     Standing Status:   Future     Number of Occurrences:   1     Standing Expiration Date:   1/26/2024    T4, Free     Standing Status:   Future     Number of Occurrences:   1     Standing Expiration Date:   1/26/2024    TSH     Standing Status:   Future     Number of Occurrences:   1     Standing Expiration Date:   1/26/2024    Lupus Anticoagulant Eval w/Reflex     Standing Status:   Future     Number of Occurrences:   1     Standing Expiration Date:   1/26/2024    Vitamin D     Standing Status:   Future     Number of Occurrences:   1     Standing Expiration Date:   1/26/2024    Hepatitis C Antibody     Standing Status:   Future     Number of Occurrences:   1     Standing Expiration Date:   1/26/2024     Order Specific Question:   Release to patient     Answer:   Immediate    Uric Acid     Standing Status:   Future     Number of Occurrences:   1     Standing Expiration Date:   1/26/2024    Antistreptolysin O  (ASO)     Standing Status:   Future     Number of Occurrences:   1     Standing Expiration Date:   1/26/2024    Anti-DNA Ab, Double-Stranded     Standing Status:   Future     Number of Occurrences:   1     Standing Expiration Date:   1/26/2024    TRACEY EIA w/ Reflex to dsDNA/ALLAN     Standing Status:   Future     Number of Occurrences:   1     Standing Expiration Date:   1/26/2024    C3 Complement     Standing Status:   Future     Number of Occurrences:   1     Standing Expiration Date:   12/26/2023    C4 Complement     Standing Status:   Future     Number of Occurrences:   1     Standing Expiration Date:   12/26/2023    CK     Standing Status:   Future     Number of Occurrences:   1     Standing Expiration Date:   1/26/2024    Complement, Total     Standing Status:   Future     Number of Occurrences:   1     Standing Expiration Date:   12/26/2023    Cyclic Citrullinated Peptide Antibody, IgG     Standing Status:   Future     Number of Occurrences:   1     Standing Expiration Date:   12/24/2024    Quantiferon Gold TB     Standing Status:   Future     Number of Occurrences:   1     Standing Expiration Date:   12/24/2024    Ehrlichia Antibody Panel     Standing Status:   Future     Number of Occurrences:   1     Standing Expiration Date:   1/26/2024    Spotted Fever Group Antibodies     Standing Status:   Future     Number of Occurrences:   1     Standing Expiration Date:   1/26/2024    Lyme Antibody w/ Immunoblot Reflex     Standing Status:   Future     Number of Occurrences:   1     Standing Expiration Date:   1/26/2024    POCT Urine Drug Screen Presump     Interpretive Information:     Negative:  No drug detected at the cut off level.   Positive:  This result represents presumptive positive for the   tested drug, other substances may yield a positive response other   than the analyte of interest. This result should be utilized for   diagnostic purpose only. Confirmation testing will be performed upon physician request  only.       Case Request Operating Room: Thoacic Medial Branch Block T8-10     Order Specific Question:   Medical Necessity:     Answer:   Medically Non-Urgent [100]     Order Specific Question:   CPT Code:     Answer:   NC INJ DX/THER AGNT PARAVERT FACET JOINT,IMG GUIDE,CERV/THORAC, 1ST LEVEL [73563]     Order Specific Question:   CPT Code:     Answer:   NC INJ DX/THER AGNT PARAVERT FACET JOINT,IMG GUIDE,CERV/THORAC, 2ND LEVEL [22791]     Order Specific Question:   CPT Code:     Answer:   NC INJ DX/THER AGNT PARAVERT FACET JOINT,IMG GUIDE,CERV/THORAC, ADD LEVEL [40261]     Order Specific Question:   Is an on-site pathologist required for this procedure?     Answer:   N/A       Requested Prescriptions     Signed Prescriptions Disp Refills    ergocalciferol (VITAMIN D2) 50,000 unit Cap 8 capsule 0     Sig: Take 1 capsule (50,000 Units total) by mouth every 7 days. for 8 doses       Assessment:     1. Thoracic spondylosis    2. Back pain, unspecified back location, unspecified back pain laterality, unspecified chronicity    3. Encounter for long-term (current) use of other medications    4. Myalgia    5. Polyarthralgia    6. Vitamin D deficiency         A's of Opioid Risk Assessment  Activity:Patient can perform ADL.   Analgesia:Patients pain is partially controlled by current medication. Patient has tried OTC medications such as Tylenol and Ibuprofen with out relief.   Adverse Effects: Patient denies constipation or sedation.  Aberrant Behavior:  reviewed with no aberrant drug seeking/taking behavior.  Overdose reversal drug naloxone discussed    Drug screen reviewed      Plan:    October 26, 2023 vitamin-D level 21.5 will start vitamin-D replacement patient notified        New patient     Patient request call only with procedures    Zeinab Yang NP    Midthoracic pain, lower back pain ongoing for more than 3 months greater than 10 years, she states approximately 6 weeks ago pain flared up after stepping off of a  sidewalk sharp stabbing pain worse with flexion extension rotation thoracic and lumbar spine ongoing for more than 3 months    She denies loss of bowel or bladder function denies weakness difficulty ambulating    [facility]  Was not happy after this visit    Benzodiazepine Xanax 0.5 mg primary care provider    Labs reviewed through October 19, 2023  TSH November 18, 2021 3.1  Hemoglobin A1c 7.4    She states she is seen chiropractic treatment in the past did not improve her discomfort     She continues her home exercise program ongoing for more than a year 3 days a week 30-45 minutes per session not helping with her discomfort     Anti-inflammatories ibuprofen, naproxen sodium have not improve her discomfort    Requesting procedure for midthoracic pain     Requesting evaluation polyarthralgia myalgia     X-ray thoracic spine today    Will obtain records from previous pain treatment Center     MRI thoracic spine [facility] dated April 14, 2023, multiple level degenerative changes    X-ray thoracic spine [facility] dated April 14, 2023 degenerative changes no fracture noted    X-ray lumbar spine April 14, 2023 degenerative changes no fracture noted    Indications for this procedure for this specific patient include the following   - Pt has had symptoms for three months with moderate to severe pain with functional impairment rated of 7/10 pain. Pain is severe enough to affect her quality of life and function  - Pain non-responsive to conservative care.    - Pain predominately axial and not associated with radiculopathy or claudication.    - No non-facet pathology as source of pain.    - Clinical assessment implicates facet joint as putative pain source.    - facet loading maneuver positive  - Pain is exacerbated by extension or prolonged sitting/standing and relieved by rest.    - No unexplained neurologic deficit.    - No history of coagulopathy,  infection or unstable medical conditions.    - Pain is causing significant functional limitation resulting in diminished quality of life and impaired age appropriate ADL's.   - Clinical assessment implicates facet joint as putative source of pain  - Repeat injections not done prior to 7 days   - no more than 2 levels will be done    -procedure done prior to surgical consideration  The planned medically necessary  surgical procedure is performed in a hospital outpatient department and not in an ambulatory surgical center due to:     -there is no geographically assessable ambulatory surgery center that has the  necessary equipment and fluoroscopy needed for the procedure     -there is no geographically assessable ambulatory surgical center available at which the physician has privileges     -an ASC's  specific  guideline regarding the individuals weight or health conditions that prevent the use of an ASC     -Medial branch block performed in consideration for RFTC, not just for therapeutic treatment    -done under fluoro    Local only,  patient request    Thoracic medial branch block T8 through 10 bilateral, # 1, thoracic spondylosis    Dr. Gomez    Bring original prescription medication bottles/container/box with labels to each visit

## 2023-10-26 ENCOUNTER — TELEPHONE (OUTPATIENT)
Dept: PAIN MEDICINE | Facility: CLINIC | Age: 48
End: 2023-10-26
Payer: COMMERCIAL

## 2023-10-26 ENCOUNTER — HOSPITAL ENCOUNTER (OUTPATIENT)
Dept: RADIOLOGY | Facility: HOSPITAL | Age: 48
Discharge: HOME | End: 2023-10-26
Attending: PHYSICIAN ASSISTANT
Payer: COMMERCIAL

## 2023-10-26 ENCOUNTER — OFFICE VISIT (OUTPATIENT)
Dept: PAIN MEDICINE | Facility: CLINIC | Age: 48
End: 2023-10-26
Payer: COMMERCIAL

## 2023-10-26 ENCOUNTER — PATIENT MESSAGE (OUTPATIENT)
Dept: PAIN MEDICINE | Facility: CLINIC | Age: 48
End: 2023-10-26
Payer: COMMERCIAL

## 2023-10-26 VITALS — BODY MASS INDEX: 42.43 KG/M2 | WEIGHT: 264 LBS | HEIGHT: 66 IN | RESPIRATION RATE: 18 BRPM

## 2023-10-26 DIAGNOSIS — Z79.899 ENCOUNTER FOR LONG-TERM (CURRENT) USE OF OTHER MEDICATIONS: ICD-10-CM

## 2023-10-26 DIAGNOSIS — M54.9 BACK PAIN, UNSPECIFIED BACK LOCATION, UNSPECIFIED BACK PAIN LATERALITY, UNSPECIFIED CHRONICITY: ICD-10-CM

## 2023-10-26 DIAGNOSIS — M79.10 MYALGIA: Chronic | ICD-10-CM

## 2023-10-26 DIAGNOSIS — E55.9 VITAMIN D DEFICIENCY: ICD-10-CM

## 2023-10-26 DIAGNOSIS — M25.50 POLYARTHRALGIA: Chronic | ICD-10-CM

## 2023-10-26 DIAGNOSIS — M47.814 THORACIC SPONDYLOSIS: Primary | Chronic | ICD-10-CM

## 2023-10-26 DIAGNOSIS — M47.814 THORACIC SPONDYLOSIS: ICD-10-CM

## 2023-10-26 LAB

## 2023-10-26 PROCEDURE — 1159F MED LIST DOCD IN RCRD: CPT | Mod: CPTII,,, | Performed by: PHYSICIAN ASSISTANT

## 2023-10-26 PROCEDURE — 3008F BODY MASS INDEX DOCD: CPT | Mod: CPTII,,, | Performed by: PHYSICIAN ASSISTANT

## 2023-10-26 PROCEDURE — 4010F ACE/ARB THERAPY RXD/TAKEN: CPT | Mod: CPTII,,, | Performed by: PHYSICIAN ASSISTANT

## 2023-10-26 PROCEDURE — 99999PBSHW POCT URINE DRUG SCREEN PRESUMP: Mod: PBBFAC,,,

## 2023-10-26 PROCEDURE — 99215 OFFICE O/P EST HI 40 MIN: CPT | Mod: PBBFAC | Performed by: PHYSICIAN ASSISTANT

## 2023-10-26 PROCEDURE — 4010F PR ACE/ARB THEARPY RXD/TAKEN: ICD-10-PCS | Mod: CPTII,,, | Performed by: PHYSICIAN ASSISTANT

## 2023-10-26 PROCEDURE — 1159F PR MEDICATION LIST DOCUMENTED IN MEDICAL RECORD: ICD-10-PCS | Mod: CPTII,,, | Performed by: PHYSICIAN ASSISTANT

## 2023-10-26 PROCEDURE — 99204 OFFICE O/P NEW MOD 45 MIN: CPT | Mod: S$PBB,,, | Performed by: PHYSICIAN ASSISTANT

## 2023-10-26 PROCEDURE — 72070 XR THORACIC SPINE AP LATERAL: ICD-10-PCS | Mod: 26,,, | Performed by: RADIOLOGY

## 2023-10-26 PROCEDURE — 72070 X-RAY EXAM THORAC SPINE 2VWS: CPT | Mod: 26,,, | Performed by: RADIOLOGY

## 2023-10-26 PROCEDURE — 99999PBSHW POCT URINE DRUG SCREEN PRESUMP: ICD-10-PCS | Mod: PBBFAC,,,

## 2023-10-26 PROCEDURE — 3008F PR BODY MASS INDEX (BMI) DOCUMENTED: ICD-10-PCS | Mod: CPTII,,, | Performed by: PHYSICIAN ASSISTANT

## 2023-10-26 PROCEDURE — 80305 DRUG TEST PRSMV DIR OPT OBS: CPT | Mod: PBBFAC | Performed by: PHYSICIAN ASSISTANT

## 2023-10-26 PROCEDURE — 99204 PR OFFICE/OUTPT VISIT, NEW, LEVL IV, 45-59 MIN: ICD-10-PCS | Mod: S$PBB,,, | Performed by: PHYSICIAN ASSISTANT

## 2023-10-26 PROCEDURE — 72070 X-RAY EXAM THORAC SPINE 2VWS: CPT | Mod: TC

## 2023-10-26 RX ORDER — ERGOCALCIFEROL 1.25 MG/1
50000 CAPSULE ORAL
Qty: 8 CAPSULE | Refills: 0 | Status: SHIPPED | OUTPATIENT
Start: 2023-10-26 | End: 2023-12-15

## 2023-10-26 NOTE — PATIENT INSTRUCTIONS
Dentures are to be removed prior to procedure or we can provide you with a denture cup to remove them prior to being taken back for procedure.   False eyelashes are to be removed before the morning of procedure.    Contacts will have to be removed prior to procedure.  No jewelry is to be worn to the procedure.     HOLD ASPIRIN AND ASPIRIN PRODUCTS  (ASPIRIN, BC POWDER ETC. ) FOR 7 DAYS  PRIOR TO PROCEDURE  HOLD NSAIDS( ibuprofen, mobic, meloxicam, advil, diclofenac, naproxen, relafen, celebrex,  methotrexate, aleve etc....)  FOR 3 DAYS   PRIOR TO PROCEDURE  Hold ozempic starting 11/7

## 2023-10-26 NOTE — TELEPHONE ENCOUNTER
----- Message from Kyra Hayes sent at 10/26/2023 10:53 AM CDT -----  Regarding: pt needs to speak to nurse  Pt would like to speak to nurse regarding some type of pain meds was just in office new pt visit please jenny pt back at [phone]    SHERIN JEAN   10/26/2023   11:59 AM   Must see Dr. Gomez before pain meds can be given.

## 2023-10-30 ENCOUNTER — TELEPHONE (OUTPATIENT)
Dept: PAIN MEDICINE | Facility: CLINIC | Age: 48
End: 2023-10-30
Payer: COMMERCIAL

## 2023-11-14 ENCOUNTER — HOSPITAL ENCOUNTER (OUTPATIENT)
Facility: HOSPITAL | Age: 48
Discharge: HOME | End: 2023-11-14
Attending: PAIN MEDICINE | Admitting: PAIN MEDICINE
Payer: COMMERCIAL

## 2023-11-14 VITALS
HEART RATE: 71 BPM | HEIGHT: 66 IN | RESPIRATION RATE: 16 BRPM | BODY MASS INDEX: 41.62 KG/M2 | TEMPERATURE: 99 F | SYSTOLIC BLOOD PRESSURE: 141 MMHG | DIASTOLIC BLOOD PRESSURE: 94 MMHG | OXYGEN SATURATION: 99 % | WEIGHT: 259 LBS

## 2023-11-14 DIAGNOSIS — M47.814 THORACIC SPONDYLOSIS: Primary | Chronic | ICD-10-CM

## 2023-11-14 PROCEDURE — 64494 INJ PARAVERT F JNT L/S 2 LEV: CPT | Mod: RT,,, | Performed by: PAIN MEDICINE

## 2023-11-14 PROCEDURE — 64493 PR INJ DX/THER AGNT PARAVERT FACET JOINT,IMG GUIDE,LUMBAR/SAC,1ST LVL: ICD-10-PCS | Mod: RT,,, | Performed by: PAIN MEDICINE

## 2023-11-14 PROCEDURE — 64494 PR INJ DX/THER AGNT PARAVERT FACET JOINT,IMG GUIDE,LUMBAR/SAC, 2ND LEVEL: ICD-10-PCS | Mod: RT,,, | Performed by: PAIN MEDICINE

## 2023-11-14 PROCEDURE — 64493 INJ PARAVERT F JNT L/S 1 LEV: CPT | Mod: RT,,, | Performed by: PAIN MEDICINE

## 2023-11-14 PROCEDURE — 63600175 PHARM REV CODE 636 W HCPCS: Performed by: PAIN MEDICINE

## 2023-11-14 PROCEDURE — 64493 INJ PARAVERT F JNT L/S 1 LEV: CPT | Mod: RT | Performed by: PAIN MEDICINE

## 2023-11-14 PROCEDURE — 64494 INJ PARAVERT F JNT L/S 2 LEV: CPT | Mod: RT | Performed by: PAIN MEDICINE

## 2023-11-14 RX ORDER — BUPIVACAINE HYDROCHLORIDE 2.5 MG/ML
INJECTION, SOLUTION EPIDURAL; INFILTRATION; INTRACAUDAL CODE/TRAUMA/SEDATION MEDICATION
Status: DISCONTINUED | OUTPATIENT
Start: 2023-11-14 | End: 2023-11-14 | Stop reason: HOSPADM

## 2023-11-14 RX ORDER — SODIUM CHLORIDE 9 MG/ML
INJECTION, SOLUTION INTRAVENOUS CONTINUOUS
Status: DISCONTINUED | OUTPATIENT
Start: 2023-11-14 | End: 2023-11-14 | Stop reason: HOSPADM

## 2023-11-14 RX ORDER — TRIAMCINOLONE ACETONIDE 40 MG/ML
INJECTION, SUSPENSION INTRA-ARTICULAR; INTRAMUSCULAR CODE/TRAUMA/SEDATION MEDICATION
Status: DISCONTINUED | OUTPATIENT
Start: 2023-11-14 | End: 2023-11-14 | Stop reason: HOSPADM

## 2023-11-14 NOTE — DISCHARGE SUMMARY
[facility] - Pain Management  Discharge Note  Short Stay    Procedure(s) (LRB):  Thoacic Medial Branch Block T8-10 (Bilateral)      OUTCOME: Patient tolerated treatment/procedure well without complication and is now ready for discharge.    DISPOSITION: Home or Self Care    FINAL DIAGNOSIS:  Right thoracic spondylosis without myelopathy    FOLLOWUP: In clinic    DISCHARGE INSTRUCTIONS:  See nurse's notes     TIME SPENT ON DISCHARGE: 5 minutes

## 2023-11-14 NOTE — PLAN OF CARE
Plan:  D/c pt via wheelchair at 1225  Informed pt if does not void in 8 hours to go to ER. Notify if redness, drainage, from injection site or fever over next 3-4 days. Rest and drink plenty of fluids for the remainder of the day. No lifting over 5 lbs. For the remainder of the day. Continue regular medications as prescribed. May take pain medications as prescribed.     Pain improved 100%      Pre-procedure pain: 8    Post-procedure pain: 0

## 2023-11-14 NOTE — OP NOTE
Procedure Note    Procedure Date: 11/14/2023    Procedure Performed:  Right Thoracic Facet  Medial Branch Block @ T10-11,11-12 with Fluoroscopic Guidance    Indications: Patient has failed conservative therapy.      Pre-op diagnosis: Thoracic Spondylosis    Post-op diagnosis: same    Physician: NHI Gomez MD    Anesthesia:Local    Estimated Blood Loss: Less than 1cc    IVF: None    Complications: None    Addendum to procedure:  Patient was scheduled for bilateral T8 -10 medial branch blocks.  On the day of procedure she was pain-free on the left  and she  experienced pain from T10 -12 under fluoroscopic needle localization.  She was pain free from the thoracic T8-T9.  Therefore procedure was performed at T10-11 and T11-12 for diagnostic and therapeutic benefits.    Technique:  The patient was interviewed in the holding area and Risks/Benefits were discussed, including but not limited to  the possibility of new or different pain, bleeding or infection.   All questions were answered.  The patient understood and accepted risks.  Consent was reviewed and signed.  A time out was taken to identify the patient, procedure and side of the procedure. The patient was placed in a prone position, then prepped and draped in the usual sterile fashion using ChloraPrep and sterile towels.  The levels were determined under fluoroscopic guidance and then marked.  1% Lidocaine was given by raising a skin wheal at the skin over each site and then infiltrated.  A 22-gauge 3.5 inch needle was introduced to the anatomic location of the right T10-11, 11-12 medial branch nerves.  Then, after negative aspiration, 1 cc from a  mixture of Bupivacaine 0.25% 1cc  and  40mg kenalog ) was injected @ each level.The patient tolerated the procedure well and was transferred to the [facility] in stable condition.  The patient was monitored after the procedure.  Patient was given post procedure and discharge instructions to follow at home.  The patient was  discharged in a stable condition with an adult .

## 2023-11-14 NOTE — BRIEF OP NOTE
The  Discharge Note  Short Stay    Admit Date: 11/14/2023    Discharge Date: 11/14/2023    Attending Physician: Carol Gomez     Discharge Provider: Carol Gomez    Diagnosis:  Right thoracic spondylosis without myelopathy    Procedure performed:  Right T10-11, 11-12 medial branch block under fluoroscopic guided needle localization    Findings: Procedure tolerated well and without complications. Consistent with diagnosis.    EBL: 0cc    Specimens: None    Discharged Condition: Good    Final Diagnoses: Thoracic spondylosis [M47.814]    Disposition: Home or Self Care    Hospital Course: No complications, uneventful    Outcome of Hospitalization, Treatment, Procedure, or Surgery:  Patient was admitted for outpatient interventional pain management procedure. The patient tolerated the procedure well with no complications.    Follow up/Patient Instructions:  Follow up as scheduled in Pain Management office in 3-4 weeks.  Patient has received instructions and follow up date and time.    Medications:  Continue previous medications

## 2023-12-18 DIAGNOSIS — F32.A DEPRESSION, UNSPECIFIED DEPRESSION TYPE: Primary | ICD-10-CM

## 2023-12-18 RX ORDER — ARIPIPRAZOLE 2 MG/1
2 TABLET ORAL DAILY
Qty: 90 TABLET | Refills: 3 | Status: SHIPPED | OUTPATIENT
Start: 2023-12-18

## 2023-12-18 RX ORDER — ARIPIPRAZOLE 2 MG/1
2 TABLET ORAL DAILY
COMMUNITY
End: 2023-12-18 | Stop reason: SDUPTHER

## 2024-01-05 ENCOUNTER — OFFICE VISIT (OUTPATIENT)
Dept: FAMILY MEDICINE | Facility: CLINIC | Age: 49
End: 2024-01-05
Payer: COMMERCIAL

## 2024-01-05 VITALS
DIASTOLIC BLOOD PRESSURE: 88 MMHG | RESPIRATION RATE: 18 BRPM | TEMPERATURE: 99 F | HEART RATE: 78 BPM | BODY MASS INDEX: 40.5 KG/M2 | HEIGHT: 66 IN | OXYGEN SATURATION: 100 % | SYSTOLIC BLOOD PRESSURE: 128 MMHG | WEIGHT: 252 LBS

## 2024-01-05 DIAGNOSIS — B00.1 RECURRENT COLD SORES: ICD-10-CM

## 2024-01-05 DIAGNOSIS — K59.03 DRUG-INDUCED CONSTIPATION: ICD-10-CM

## 2024-01-05 DIAGNOSIS — M25.50 ARTHRALGIA, UNSPECIFIED JOINT: ICD-10-CM

## 2024-01-05 DIAGNOSIS — Z20.828 EXPOSURE TO THE FLU: ICD-10-CM

## 2024-01-05 DIAGNOSIS — E11.9 TYPE 2 DIABETES MELLITUS WITHOUT COMPLICATION, WITHOUT LONG-TERM CURRENT USE OF INSULIN: ICD-10-CM

## 2024-01-05 DIAGNOSIS — I10 PRIMARY HYPERTENSION: Primary | ICD-10-CM

## 2024-01-05 DIAGNOSIS — J30.2 SEASONAL ALLERGIES: ICD-10-CM

## 2024-01-05 DIAGNOSIS — Z76.89 ENCOUNTER TO ESTABLISH CARE: ICD-10-CM

## 2024-01-05 LAB
CTP QC/QA: YES
FLUAV AG NPH QL: NEGATIVE
FLUBV AG NPH QL: NEGATIVE

## 2024-01-05 PROCEDURE — 1159F MED LIST DOCD IN RCRD: CPT | Mod: CPTII,,, | Performed by: NURSE PRACTITIONER

## 2024-01-05 PROCEDURE — 87804 INFLUENZA ASSAY W/OPTIC: CPT | Mod: QW,,, | Performed by: NURSE PRACTITIONER

## 2024-01-05 PROCEDURE — 3074F SYST BP LT 130 MM HG: CPT | Mod: CPTII,,, | Performed by: NURSE PRACTITIONER

## 2024-01-05 PROCEDURE — 99213 OFFICE O/P EST LOW 20 MIN: CPT | Mod: ,,, | Performed by: NURSE PRACTITIONER

## 2024-01-05 PROCEDURE — 3079F DIAST BP 80-89 MM HG: CPT | Mod: CPTII,,, | Performed by: NURSE PRACTITIONER

## 2024-01-05 PROCEDURE — 1160F RVW MEDS BY RX/DR IN RCRD: CPT | Mod: CPTII,,, | Performed by: NURSE PRACTITIONER

## 2024-01-05 PROCEDURE — 4010F ACE/ARB THERAPY RXD/TAKEN: CPT | Mod: CPTII,,, | Performed by: NURSE PRACTITIONER

## 2024-01-05 PROCEDURE — 3008F BODY MASS INDEX DOCD: CPT | Mod: CPTII,,, | Performed by: NURSE PRACTITIONER

## 2024-01-05 RX ORDER — MELOXICAM 7.5 MG/1
7.5 TABLET ORAL DAILY
Qty: 90 TABLET | Refills: 0 | Status: SHIPPED | OUTPATIENT
Start: 2024-01-05

## 2024-01-05 RX ORDER — SEMAGLUTIDE 1.34 MG/ML
1 INJECTION, SOLUTION SUBCUTANEOUS
Qty: 3 ML | Refills: 11 | Status: SHIPPED | OUTPATIENT
Start: 2024-01-05 | End: 2025-01-04

## 2024-01-05 RX ORDER — VALACYCLOVIR HYDROCHLORIDE 500 MG/1
500 TABLET, FILM COATED ORAL 2 TIMES DAILY
COMMUNITY
End: 2024-01-05 | Stop reason: SDUPTHER

## 2024-01-05 RX ORDER — VALACYCLOVIR HYDROCHLORIDE 500 MG/1
500 TABLET, FILM COATED ORAL 2 TIMES DAILY
Qty: 45 TABLET | Refills: 0 | Status: SHIPPED | OUTPATIENT
Start: 2024-01-05

## 2024-01-05 RX ORDER — MELOXICAM 7.5 MG/1
7.5 TABLET ORAL DAILY
COMMUNITY
End: 2024-01-05 | Stop reason: SDUPTHER

## 2024-01-05 RX ORDER — FLUTICASONE PROPIONATE 50 MCG
2 SPRAY, SUSPENSION (ML) NASAL DAILY
Qty: 16 G | Refills: 11 | Status: SHIPPED | OUTPATIENT
Start: 2024-01-05

## 2024-01-05 NOTE — PROGRESS NOTES
Elmira Morales NP   [address] [place] [address]     PATIENT NAME: Mimi Davis  : 1975  DATE: 24  MRN: [id number]      Billing Provider: Elmira Morales NP  Level of Service:   Patient PCP Information       Provider PCP Type    Mohsen Taylor MD General            Reason for Visit / Chief Complaint: Establish Care (Establish care, concerned about blood pressure and needs medications refilled. /Concerned about exposure to flu wants a test today )       Update PCP  Update Chief Complaint         History of Present Illness / Problem Focused Workflow     Mimi Davis presents to the clinic with Establish Care (Establish care, concerned about blood pressure and needs medications refilled. /Concerned about exposure to flu wants a test today )     HPI  Patient presents today wishing to establish care. She is a previous patient of Dr. Young. She does also see Dr. Taylor(cardiologist), pain management [facility], and has a psychiatrist she sees regularly. She has a PMH of hypertension and prediabetes. FH includes diabetes, alzheimers, heart disease, HTN, and HLD. Last lab work was in November and had some abnormalities to lipid panel. She is due for mammogram and pap smear. She reports she will schedule both. She has had her flu vaccine this year and is UTD on Tdap. She does need refills at today's visit. She denies any other needs at this time.     Review of Systems     Review of Systems   Constitutional:  Negative for activity change, appetite change, chills and fever.   HENT:  Negative for ear pain, hearing loss, trouble swallowing and voice change.    Eyes:  Negative for visual disturbance.   Respiratory:  Negative for apnea, cough, chest tightness and shortness of breath.    Cardiovascular:  Negative for chest pain, palpitations and leg swelling.   Gastrointestinal:  Negative for abdominal pain, blood in stool, change in bowel habit and reflux.   Genitourinary:  Negative for bladder  incontinence, difficulty urinating and flank pain.   Musculoskeletal:  Negative for back pain, gait problem, joint swelling and myalgias.   Integumentary:  Negative for color change and pallor.   Neurological:  Negative for dizziness, weakness, numbness and headaches.   Psychiatric/Behavioral:  Negative for sleep disturbance. The patient is not nervous/anxious.         Medical / Social / Family History     Past Medical History:   Diagnosis Date    Anemia     Anxiety     Depression     Diabetes mellitus, type 2     Encounter for long-term (current) use of other medications     GERD (gastroesophageal reflux disease)     Goiter, non-toxic     H/O echocardiogram 08/06/2013    EF 65%,MILD MR,TR,WI    Hx of screening mammography 12/16/2021    Hypertension     Low back pain     Mitral valve disorder     Papanicolaou smear 07/15/2013    Vitamin D deficiency        Past Surgical History:   Procedure Laterality Date    CHOLECYSTECTOMY      INJECTION OF FACET JOINT Right 11/14/2023    Procedure: Thoacic Medial Branch Block T8-10;  Surgeon: Carol Gomez MD;  Location: [facility];  Service: Pain Management;  Laterality: Right;    TUBAL LIGATION  2001       Social History  Ms.  reports that she has never smoked. She has never been exposed to tobacco smoke. She has never used smokeless tobacco. She reports that she does not drink alcohol and does not use drugs.    Family History  Ms.'s family history includes Diabetes in her mother; Hypertension in her mother; Kidney disease in her father; Mental illness in her brother and sister; Thyroid disease in her mother.    Medications and Allergies     Medications  Outpatient Medications Marked as Taking for the 1/5/24 encounter (Office Visit) with Elmira Morales NP   Medication Sig Dispense Refill    ARIPiprazole (ABILIFY) 2 MG Tab Take 1 tablet (2 mg total) by mouth once daily. 90 tablet 3    carvediloL (COREG) 25 MG tablet Take 25 mg by mouth 2 (two) times daily.       "cloNIDine (CATAPRES) 0.1 MG tablet Take 0.1 mg by mouth 2 (two) times daily. Prn High BP      doxazosin (CARDURA) 4 MG tablet Take 2 mg by mouth 2 (two) times daily.      hydroCHLOROthiazide (HYDRODIURIL) 25 MG tablet Take 25 mg by mouth.      levomilnacipran (FETZIMA) 120 mg Cs24 Take 120 mg by mouth once daily. 90 capsule 3    valsartan (DIOVAN) 160 MG tablet Take 160 mg by mouth.      [DISCONTINUED] fluticasone propionate (FLONASE) 50 mcg/actuation nasal spray 2 sprays (100 mcg total) by Each Nostril route once daily. 16 g 11    [DISCONTINUED] linaCLOtide (LINZESS) 145 mcg Cap capsule Take 1 capsule (145 mcg total) by mouth before breakfast. 30 capsule 5    [DISCONTINUED] meloxicam (MOBIC) 7.5 MG tablet Take 7.5 mg by mouth once daily.      [DISCONTINUED] semaglutide (OZEMPIC) 1 mg/dose (4 mg/3 mL) Inject 1 mg into the skin every 7 days. 3 mL 11    [DISCONTINUED] valACYclovir (VALTREX) 500 MG tablet Take 500 mg by mouth 2 (two) times daily.         Allergies  Review of patient's allergies indicates:  No Known Allergies    Physical Examination   /88 (BP Location: Left arm, Patient Position: Sitting, BP Method: Large (Manual))   Pulse 78   Temp 98.7 °F (37.1 °C) (Temporal)   Resp 18   Ht 5' 6" (1.676 m)   Wt 114.3 kg (252 lb)   SpO2 100%   BMI 40.67 kg/m²    Physical Exam  Constitutional:       Appearance: Normal appearance. She is normal weight.   HENT:      Head: Normocephalic.      Nose: Nose normal.      Mouth/Throat:      Mouth: Mucous membranes are moist.      Pharynx: Oropharynx is clear.   Eyes:      Extraocular Movements: Extraocular movements intact.      Conjunctiva/sclera: Conjunctivae normal.      Pupils: Pupils are equal, round, and reactive to light.   Cardiovascular:      Rate and Rhythm: Normal rate and regular rhythm.      Heart sounds: Normal heart sounds.   Pulmonary:      Breath sounds: Normal breath sounds.   Abdominal:      General: Bowel sounds are normal.   Musculoskeletal:    "      General: Normal range of motion.      Cervical back: Normal range of motion and neck supple.   Skin:     General: Skin is warm and dry.   Neurological:      General: No focal deficit present.      Mental Status: She is alert and oriented to person, place, and time.   Psychiatric:         Mood and Affect: Mood normal.          Assessment and Plan (including Health Maintenance)      Problem List  Smart Sets  Document Outside HM   :    Plan:   Will repeat lab work in 3 months.   Continue medication as prescribed.  Encouraged diet and exercise.  RTC as needed.        Health Maintenance Due   Topic Date Due    COVID-19 Vaccine (1) Never done    Diabetes Urine Screening  Never done    Pneumococcal Vaccines (Age 0-64) (1 - PCV) Never done    Foot Exam  Never done    Eye Exam  Never done    HIV Screening  Never done    TETANUS VACCINE  Never done    Colorectal Cancer Screening  Never done    Mammogram  12/16/2022    Hemoglobin A1c  04/28/2023    Influenza Vaccine (1) Never done    Lipid Panel  10/28/2023       Problem List Items Addressed This Visit          Cardiac/Vascular    Hypertension - Primary       Endocrine    Diabetes mellitus, type 2    Relevant Medications    semaglutide (OZEMPIC) 1 mg/dose (4 mg/3 mL)     Other Visit Diagnoses       Exposure to the flu        Relevant Orders    POCT Influenza A/B Rapid Antigen (Completed)    Arthralgia, unspecified joint        Relevant Medications    meloxicam (MOBIC) 7.5 MG tablet    Drug-induced constipation        Relevant Medications    linaCLOtide (LINZESS) 145 mcg Cap capsule    Seasonal allergies        Relevant Medications    fluticasone propionate (FLONASE) 50 mcg/actuation nasal spray    Recurrent cold sores        Relevant Medications    valACYclovir (VALTREX) 500 MG tablet    Encounter to establish care                The patient has no Health Maintenance topics of status Not Due    Future Appointments   Date Time Provider Department Center   4/5/2024  1:00 PM  Elmira Morales NP [facility] [place]            Signature:  Elmira Morales NP      [address]  [place] [address]    Date of encounter: 1/5/24

## 2024-02-18 ENCOUNTER — OFFICE VISIT (OUTPATIENT)
Dept: FAMILY MEDICINE | Facility: CLINIC | Age: 49
End: 2024-02-18
Payer: COMMERCIAL

## 2024-02-18 VITALS
HEIGHT: 66 IN | HEART RATE: 88 BPM | SYSTOLIC BLOOD PRESSURE: 154 MMHG | DIASTOLIC BLOOD PRESSURE: 101 MMHG | WEIGHT: 245 LBS | OXYGEN SATURATION: 99 % | TEMPERATURE: 98 F | BODY MASS INDEX: 39.37 KG/M2 | RESPIRATION RATE: 20 BRPM

## 2024-02-18 DIAGNOSIS — N30.00 ACUTE CYSTITIS WITHOUT HEMATURIA: Primary | ICD-10-CM

## 2024-02-18 DIAGNOSIS — R10.9 FLANK PAIN: ICD-10-CM

## 2024-02-18 LAB
BILIRUB SERPL-MCNC: ABNORMAL MG/DL
BLOOD URINE, POC: ABNORMAL
COLOR, POC UA: ABNORMAL
GLUCOSE UR QL STRIP: NEGATIVE
KETONES UR QL STRIP: ABNORMAL
LEUKOCYTE ESTERASE URINE, POC: ABNORMAL
NITRITE, POC UA: ABNORMAL
PH, POC UA: 5.5
PROTEIN, POC: NEGATIVE
SPECIFIC GRAVITY, POC UA: 1.03
UROBILINOGEN, POC UA: 0.2

## 2024-02-18 PROCEDURE — 3008F BODY MASS INDEX DOCD: CPT | Mod: CPTII,,, | Performed by: FAMILY MEDICINE

## 2024-02-18 PROCEDURE — 99051 MED SERV EVE/WKEND/HOLIDAY: CPT | Mod: ,,, | Performed by: FAMILY MEDICINE

## 2024-02-18 PROCEDURE — 1160F RVW MEDS BY RX/DR IN RCRD: CPT | Mod: CPTII,,, | Performed by: FAMILY MEDICINE

## 2024-02-18 PROCEDURE — 1159F MED LIST DOCD IN RCRD: CPT | Mod: CPTII,,, | Performed by: FAMILY MEDICINE

## 2024-02-18 PROCEDURE — 3080F DIAST BP >= 90 MM HG: CPT | Mod: CPTII,,, | Performed by: FAMILY MEDICINE

## 2024-02-18 PROCEDURE — 3044F HG A1C LEVEL LT 7.0%: CPT | Mod: CPTII,,, | Performed by: FAMILY MEDICINE

## 2024-02-18 PROCEDURE — 81003 URINALYSIS AUTO W/O SCOPE: CPT | Mod: QW,,, | Performed by: FAMILY MEDICINE

## 2024-02-18 PROCEDURE — 3077F SYST BP >= 140 MM HG: CPT | Mod: CPTII,,, | Performed by: FAMILY MEDICINE

## 2024-02-18 PROCEDURE — 99214 OFFICE O/P EST MOD 30 MIN: CPT | Mod: ,,, | Performed by: FAMILY MEDICINE

## 2024-02-18 PROCEDURE — 4010F ACE/ARB THERAPY RXD/TAKEN: CPT | Mod: CPTII,,, | Performed by: FAMILY MEDICINE

## 2024-02-18 RX ORDER — CIPROFLOXACIN 500 MG/1
500 TABLET ORAL EVERY 12 HOURS
Qty: 14 TABLET | Refills: 0 | Status: SHIPPED | OUTPATIENT
Start: 2024-02-18 | End: 2024-02-25

## 2024-02-18 RX ORDER — FLUCONAZOLE 150 MG/1
150 TABLET ORAL WEEKLY
Qty: 2 TABLET | Refills: 0 | Status: SHIPPED | OUTPATIENT
Start: 2024-02-18 | End: 2024-02-25

## 2024-02-18 NOTE — PROGRESS NOTES
Subjective:       Patient ID: Mimi Davis is a 48 y.o. female.    Chief Complaint: Urinary Tract Infection (Patient presents to the clinic for possible uti (pain frequently going to the bathroom and lower back pain) and right ear pain )    Urinary Tract Infection   Associated symptoms include frequency. Pertinent negatives include no chills, flank pain, hematuria, nausea, urgency, vomiting, constipation or rash.     Review of Systems   Constitutional:  Negative for activity change, appetite change, chills, diaphoresis, fatigue, fever and unexpected weight change.   HENT:  Positive for ear pain. Negative for nasal congestion, dental problem, drooling, ear discharge, facial swelling, hearing loss, mouth sores, nosebleeds, postnasal drip, rhinorrhea, sinus pressure/congestion, sneezing, sore throat, tinnitus, trouble swallowing, voice change and goiter.    Eyes:  Negative for photophobia, discharge, itching and visual disturbance.   Respiratory:  Negative for apnea, cough, choking, chest tightness, shortness of breath, wheezing and stridor.    Cardiovascular:  Negative for chest pain, palpitations, leg swelling and claudication.   Gastrointestinal:  Negative for abdominal distention, abdominal pain, anal bleeding, blood in stool, change in bowel habit, constipation, diarrhea, nausea and vomiting.   Endocrine: Negative for cold intolerance, heat intolerance, polydipsia, polyphagia and polyuria.   Genitourinary:  Positive for dysuria and frequency. Negative for bladder incontinence, decreased urine volume, difficulty urinating, enuresis, flank pain, hematuria, nocturia, pelvic pain and urgency.   Musculoskeletal:  Negative for arthralgias, back pain, gait problem, joint swelling, leg pain, myalgias, neck pain, neck stiffness and joint deformity.   Integumentary:  Negative for pallor, rash, wound, breast mass and breast tenderness.   Allergic/Immunologic: Negative for environmental allergies, food allergies and  immunocompromised state.   Neurological:  Negative for dizziness, vertigo, tremors, seizures, syncope, facial asymmetry, speech difficulty, weakness, light-headedness, numbness, headaches, memory loss and coordination difficulties.   Hematological:  Negative for adenopathy. Does not bruise/bleed easily.   Psychiatric/Behavioral:  Negative for agitation, behavioral problems, confusion, decreased concentration, dysphoric mood, hallucinations, self-injury, sleep disturbance and suicidal ideas. The patient is not nervous/anxious and is not hyperactive.    Breast: Negative for mass and tenderness        Objective:      Physical Exam  Vitals reviewed.   Constitutional:       Appearance: Normal appearance.   HENT:      Head: Normocephalic and atraumatic.      Right Ear: Tympanic membrane, ear canal and external ear normal.      Left Ear: Tympanic membrane, ear canal and external ear normal.      Nose: Nose normal.      Mouth/Throat:      Mouth: Mucous membranes are moist.      Pharynx: Oropharynx is clear.   Eyes:      Extraocular Movements: Extraocular movements intact.      Conjunctiva/sclera: Conjunctivae normal.      Pupils: Pupils are equal, round, and reactive to light.   Cardiovascular:      Rate and Rhythm: Normal rate and regular rhythm.      Pulses: Normal pulses.      Heart sounds: Normal heart sounds.   Pulmonary:      Effort: Pulmonary effort is normal.      Breath sounds: Normal breath sounds.   Abdominal:      General: Bowel sounds are normal.      Palpations: Abdomen is soft.   Musculoskeletal:         General: Normal range of motion.      Cervical back: Normal range of motion and neck supple.   Skin:     General: Skin is warm and dry.   Neurological:      General: No focal deficit present.      Mental Status: She is alert. Mental status is at baseline.   Psychiatric:         Mood and Affect: Mood normal.         Behavior: Behavior normal.         Thought Content: Thought content normal.         Judgment:  Judgment normal.         Assessment:       1. Acute cystitis without hematuria    2. Flank pain        Plan:     Acute cystitis without hematuria    Flank pain  -     POCT URINALYSIS W/O SCOPE    Other orders  -     ciprofloxacin HCl (CIPRO) 500 MG tablet; Take 1 tablet (500 mg total) by mouth every 12 (twelve) hours. for 7 days  Dispense: 14 tablet; Refill: 0  -     fluconazole (DIFLUCAN) 150 MG Tab; Take 1 tablet (150 mg total) by mouth once a week. for 7 days  Dispense: 2 tablet; Refill: 0

## 2024-04-12 ENCOUNTER — OFFICE VISIT (OUTPATIENT)
Dept: FAMILY MEDICINE | Facility: CLINIC | Age: 49
End: 2024-04-12
Payer: COMMERCIAL

## 2024-04-12 VITALS
TEMPERATURE: 97 F | RESPIRATION RATE: 18 BRPM | BODY MASS INDEX: 39.86 KG/M2 | HEIGHT: 66 IN | SYSTOLIC BLOOD PRESSURE: 124 MMHG | OXYGEN SATURATION: 98 % | DIASTOLIC BLOOD PRESSURE: 80 MMHG | WEIGHT: 248 LBS | HEART RATE: 68 BPM

## 2024-04-12 DIAGNOSIS — I10 PRIMARY HYPERTENSION: Primary | ICD-10-CM

## 2024-04-12 DIAGNOSIS — E11.9 TYPE 2 DIABETES MELLITUS WITHOUT COMPLICATION, WITHOUT LONG-TERM CURRENT USE OF INSULIN: ICD-10-CM

## 2024-04-12 DIAGNOSIS — F41.9 ANXIETY: ICD-10-CM

## 2024-04-12 DIAGNOSIS — F32.A DEPRESSION, UNSPECIFIED DEPRESSION TYPE: ICD-10-CM

## 2024-04-12 LAB
25(OH)D3 SERPL-MCNC: 39.5 NG/ML
ALBUMIN SERPL BCP-MCNC: 3.9 G/DL (ref 3.5–5)
ALBUMIN/GLOB SERPL: 0.9 {RATIO}
ALP SERPL-CCNC: 79 U/L (ref 39–100)
ALT SERPL W P-5'-P-CCNC: 37 U/L (ref 13–56)
ANION GAP SERPL CALCULATED.3IONS-SCNC: 11 MMOL/L (ref 7–16)
AST SERPL W P-5'-P-CCNC: 29 U/L (ref 15–37)
BASOPHILS # BLD AUTO: 0.07 K/UL (ref 0–0.2)
BASOPHILS NFR BLD AUTO: 1.1 % (ref 0–1)
BILIRUB SERPL-MCNC: 0.3 MG/DL (ref ?–1.2)
BUN SERPL-MCNC: 13 MG/DL (ref 7–18)
BUN/CREAT SERPL: 13 (ref 6–20)
CALCIUM SERPL-MCNC: 9.8 MG/DL (ref 8.5–10.1)
CHLORIDE SERPL-SCNC: 105 MMOL/L (ref 98–107)
CHOLEST SERPL-MCNC: 186 MG/DL (ref 0–200)
CHOLEST/HDLC SERPL: 3.9 {RATIO}
CO2 SERPL-SCNC: 28 MMOL/L (ref 21–32)
CREAT SERPL-MCNC: 1.04 MG/DL (ref 0.55–1.02)
DIFFERENTIAL METHOD BLD: ABNORMAL
EGFR (NO RACE VARIABLE) (RUSH/TITUS): 66 ML/MIN/1.73M2
EOSINOPHIL # BLD AUTO: 0.1 K/UL (ref 0–0.5)
EOSINOPHIL NFR BLD AUTO: 1.6 % (ref 1–4)
ERYTHROCYTE [DISTWIDTH] IN BLOOD BY AUTOMATED COUNT: 13.2 % (ref 11.5–14.5)
EST. AVERAGE GLUCOSE BLD GHB EST-MCNC: 143 MG/DL
GLOBULIN SER-MCNC: 4.2 G/DL (ref 2–4)
GLUCOSE SERPL-MCNC: 90 MG/DL (ref 74–106)
HBA1C MFR BLD HPLC: 6.6 % (ref 4.5–6.6)
HCT VFR BLD AUTO: 43.1 % (ref 38–47)
HDLC SERPL-MCNC: 48 MG/DL (ref 40–60)
HGB BLD-MCNC: 13.2 G/DL (ref 12–16)
IMM GRANULOCYTES # BLD AUTO: 0.01 K/UL (ref 0–0.04)
IMM GRANULOCYTES NFR BLD: 0.2 % (ref 0–0.4)
LDLC SERPL CALC-MCNC: 120 MG/DL
LDLC/HDLC SERPL: 2.5 {RATIO}
LYMPHOCYTES # BLD AUTO: 2.88 K/UL (ref 1–4.8)
LYMPHOCYTES NFR BLD AUTO: 47.2 % (ref 27–41)
MCH RBC QN AUTO: 26.6 PG (ref 27–31)
MCHC RBC AUTO-ENTMCNC: 30.6 G/DL (ref 32–36)
MCV RBC AUTO: 86.9 FL (ref 80–96)
MONOCYTES # BLD AUTO: 0.33 K/UL (ref 0–0.8)
MONOCYTES NFR BLD AUTO: 5.4 % (ref 2–6)
MPC BLD CALC-MCNC: 11.3 FL (ref 9.4–12.4)
NEUTROPHILS # BLD AUTO: 2.71 K/UL (ref 1.8–7.7)
NEUTROPHILS NFR BLD AUTO: 44.5 % (ref 53–65)
NONHDLC SERPL-MCNC: 138 MG/DL
NRBC # BLD AUTO: 0 X10E3/UL
NRBC, AUTO (.00): 0 %
PLATELET # BLD AUTO: 313 K/UL (ref 150–400)
POTASSIUM SERPL-SCNC: 3.5 MMOL/L (ref 3.5–5.1)
PROT SERPL-MCNC: 8.1 G/DL (ref 6.4–8.2)
RBC # BLD AUTO: 4.96 M/UL (ref 4.2–5.4)
SODIUM SERPL-SCNC: 140 MMOL/L (ref 136–145)
TRIGL SERPL-MCNC: 88 MG/DL (ref 35–150)
TSH SERPL DL<=0.005 MIU/L-ACNC: 3.16 UIU/ML (ref 0.36–3.74)
VLDLC SERPL-MCNC: 18 MG/DL
WBC # BLD AUTO: 6.1 K/UL (ref 4.5–11)

## 2024-04-12 PROCEDURE — 80050 GENERAL HEALTH PANEL: CPT | Mod: ,,, | Performed by: CLINICAL MEDICAL LABORATORY

## 2024-04-12 PROCEDURE — 83036 HEMOGLOBIN GLYCOSYLATED A1C: CPT | Mod: ,,, | Performed by: CLINICAL MEDICAL LABORATORY

## 2024-04-12 PROCEDURE — 3074F SYST BP LT 130 MM HG: CPT | Mod: CPTII,,, | Performed by: NURSE PRACTITIONER

## 2024-04-12 PROCEDURE — 1159F MED LIST DOCD IN RCRD: CPT | Mod: CPTII,,, | Performed by: NURSE PRACTITIONER

## 2024-04-12 PROCEDURE — 80061 LIPID PANEL: CPT | Mod: ,,, | Performed by: CLINICAL MEDICAL LABORATORY

## 2024-04-12 PROCEDURE — 3079F DIAST BP 80-89 MM HG: CPT | Mod: CPTII,,, | Performed by: NURSE PRACTITIONER

## 2024-04-12 PROCEDURE — 82306 VITAMIN D 25 HYDROXY: CPT | Mod: ,,, | Performed by: CLINICAL MEDICAL LABORATORY

## 2024-04-12 PROCEDURE — 4010F ACE/ARB THERAPY RXD/TAKEN: CPT | Mod: CPTII,,, | Performed by: NURSE PRACTITIONER

## 2024-04-12 PROCEDURE — 3008F BODY MASS INDEX DOCD: CPT | Mod: CPTII,,, | Performed by: NURSE PRACTITIONER

## 2024-04-12 PROCEDURE — 99213 OFFICE O/P EST LOW 20 MIN: CPT | Mod: ,,, | Performed by: NURSE PRACTITIONER

## 2024-04-12 PROCEDURE — 1160F RVW MEDS BY RX/DR IN RCRD: CPT | Mod: CPTII,,, | Performed by: NURSE PRACTITIONER

## 2024-04-12 NOTE — PROGRESS NOTES
Elmira Morales NP   [address] [place] [address]     PATIENT NAME: Mimi Davis  : 1975  DATE: 24  MRN: [id number]      Billing Provider: Elmira Morales NP  Level of Service: [place] OFFICE/OUTPT VISIT, EST, LEVL II, 10-19 MIN  Patient PCP Information       Provider PCP Type    Mohsen Taylor MD General            Reason for Visit / Chief Complaint: Hypertension (3 month followup HTN)       Update PCP  Update Chief Complaint         History of Present Illness / Problem Focused Workflow     Mimi Davis presents to the clinic with Hypertension (3 month followup HTN)     Hypertension  Pertinent negatives include no chest pain, headaches, palpitations or shortness of breath.     Patient presents today for follow up on hypertension. She is taking medication as prescribed. BP is wnl in clinic today. She has still not scheduled her mammogram and pap smear but is making a reminder in her phone today to do so. She would like to discuss recent anxiety. Happens most often while driving. She is not currently on any medication for anxiety. She does take medication for depression. She has been seeing a therapist but it does not seem to be helping. She is due for lab work today.    Review of Systems     Review of Systems   Constitutional:  Negative for activity change, appetite change, chills and fever.   HENT:  Negative for ear pain, hearing loss, trouble swallowing and voice change.    Eyes:  Negative for visual disturbance.   Respiratory:  Negative for apnea, cough, chest tightness and shortness of breath.    Cardiovascular:  Negative for chest pain, palpitations and leg swelling.   Gastrointestinal:  Negative for abdominal pain, blood in stool, change in bowel habit and reflux.   Genitourinary:  Negative for bladder incontinence, difficulty urinating and flank pain.   Musculoskeletal:  Negative for back pain, gait problem, joint swelling and myalgias.   Integumentary:  Negative for color change and  pallor.   Neurological:  Negative for dizziness, weakness, numbness and headaches.   Psychiatric/Behavioral:  Negative for sleep disturbance. The patient is nervous/anxious.         Medical / Social / Family History     Past Medical History:   Diagnosis Date    Anemia     Anxiety     Depression     Diabetes mellitus, type 2     Encounter for long-term (current) use of other medications     GERD (gastroesophageal reflux disease)     Goiter, non-toxic     H/O echocardiogram 08/06/2013    EF 65%,MILD MR,TR,HI    Hx of screening mammography 12/16/2021    Hypertension     Low back pain     Mitral valve disorder     Papanicolaou smear 07/15/2013    Vitamin D deficiency        Past Surgical History:   Procedure Laterality Date    CHOLECYSTECTOMY      INJECTION OF FACET JOINT Right 11/14/2023    Procedure: Thoacic Medial Branch Block T8-10;  Surgeon: Carol Gomez MD;  Location: [facility];  Service: Pain Management;  Laterality: Right;    TUBAL LIGATION  2001       Social History  Ms.  reports that she has never smoked. She has never been exposed to tobacco smoke. She has never used smokeless tobacco. She reports that she does not drink alcohol and does not use drugs.    Family History  Ms.'s family history includes Diabetes in her mother; Hypertension in her mother; Kidney disease in her father; Mental illness in her brother and sister; Thyroid disease in her mother.    Medications and Allergies     Medications  Outpatient Medications Marked as Taking for the 4/12/24 encounter (Office Visit) with Elmira Morales NP   Medication Sig Dispense Refill    carvediloL (COREG) 25 MG tablet Take 25 mg by mouth 2 (two) times daily.      cloNIDine (CATAPRES) 0.1 MG tablet Take 0.1 mg by mouth 2 (two) times daily. Prn High BP      fluticasone propionate (FLONASE) 50 mcg/actuation nasal spray 2 sprays (100 mcg total) by Each Nostril route once daily. 16 g 11    hydroCHLOROthiazide (HYDRODIURIL) 25 MG tablet Take 25 mg by  "mouth.      levomilnacipran (FETZIMA) 120 mg Cs24 Take 120 mg by mouth once daily. 90 capsule 3    linaCLOtide (LINZESS) 145 mcg Cap capsule Take 1 capsule (145 mcg total) by mouth before breakfast. 30 capsule 5    meloxicam (MOBIC) 7.5 MG tablet Take 1 tablet (7.5 mg total) by mouth once daily. 90 tablet 0    semaglutide (OZEMPIC) 1 mg/dose (4 mg/3 mL) Inject 1 mg into the skin every 7 days. 3 mL 11    valACYclovir (VALTREX) 500 MG tablet Take 1 tablet (500 mg total) by mouth 2 (two) times daily. 45 tablet 0    valsartan (DIOVAN) 160 MG tablet Take 320 mg by mouth.         Allergies  Review of patient's allergies indicates:  No Known Allergies    Physical Examination   /80 (BP Location: Left arm, Patient Position: Sitting, BP Method: Large (Manual))   Pulse 68   Temp 97.4 °F (36.3 °C) (Temporal)   Resp 18   Ht 5' 6" (1.676 m)   Wt 112.5 kg (248 lb)   SpO2 98%   BMI 40.03 kg/m²    Physical Exam  Vitals and nursing note reviewed.   Constitutional:       Appearance: Normal appearance. She is normal weight.   HENT:      Head: Normocephalic.      Nose: Nose normal.      Mouth/Throat:      Mouth: Mucous membranes are moist.   Eyes:      Pupils: Pupils are equal, round, and reactive to light.   Cardiovascular:      Rate and Rhythm: Normal rate and regular rhythm.      Pulses: Normal pulses.      Heart sounds: Normal heart sounds.   Pulmonary:      Effort: Pulmonary effort is normal.      Breath sounds: Normal breath sounds.   Abdominal:      General: Abdomen is flat. Bowel sounds are normal.      Palpations: Abdomen is soft.   Musculoskeletal:         General: Normal range of motion.      Cervical back: Normal range of motion and neck supple.   Skin:     General: Skin is warm and dry.      Capillary Refill: Capillary refill takes less than 2 seconds.   Neurological:      General: No focal deficit present.      Mental Status: She is alert and oriented to person, place, and time. Mental status is at baseline. "   Psychiatric:         Mood and Affect: Mood normal.         Behavior: Behavior normal.          Assessment and Plan (including Health Maintenance)      Problem List  Smart Sets  Document Outside HM   :    Plan:   Lab work obtained. Will follow up with results and develop POC accordingly.  Will call Monday to schedule appointment with TIFF Thomas  Encouraged diet and exercise.  RTC as needed or in 3-6 months for follow up.          Health Maintenance Due   Topic Date Due    COVID-19 Vaccine (1) Never done    Diabetes Urine Screening  Never done    Pneumococcal Vaccines (Age 0-64) (1 of 2 - PCV) Never done    Foot Exam  Never done    Eye Exam  Never done    HIV Screening  Never done    TETANUS VACCINE  Never done    Colorectal Cancer Screening  Never done    Mammogram  12/16/2022    Hemoglobin A1c  04/28/2023    Influenza Vaccine (1) Never done    Lipid Panel  10/28/2023       Problem List Items Addressed This Visit          Cardiac/Vascular    Hypertension - Primary    Relevant Orders    CBC Auto Differential    Comprehensive Metabolic Panel    Lipid Panel    TSH    Hemoglobin A1C    Vitamin D       Endocrine    Diabetes mellitus, type 2    Relevant Orders    CBC Auto Differential    Comprehensive Metabolic Panel    Lipid Panel    TSH    Hemoglobin A1C    Vitamin D     Other Visit Diagnoses       Depression, unspecified depression type        Anxiety                The patient has no Health Maintenance topics of status Not Due    No future appointments.         Signature:  Elmira Morales NP      [address]  [place] [address]    Date of encounter: 4/12/24

## 2024-05-08 ENCOUNTER — OFFICE VISIT (OUTPATIENT)
Dept: BEHAVIORAL HEALTH | Facility: CLINIC | Age: 49
End: 2024-05-08
Payer: COMMERCIAL

## 2024-05-08 ENCOUNTER — TELEPHONE (OUTPATIENT)
Dept: FAMILY MEDICINE | Facility: CLINIC | Age: 49
End: 2024-05-08
Payer: COMMERCIAL

## 2024-05-08 VITALS
SYSTOLIC BLOOD PRESSURE: 124 MMHG | WEIGHT: 251 LBS | HEIGHT: 66 IN | RESPIRATION RATE: 18 BRPM | HEART RATE: 81 BPM | BODY MASS INDEX: 40.34 KG/M2 | TEMPERATURE: 98 F | OXYGEN SATURATION: 99 % | DIASTOLIC BLOOD PRESSURE: 82 MMHG

## 2024-05-08 DIAGNOSIS — F41.1 GENERALIZED ANXIETY DISORDER WITH PANIC ATTACKS: Primary | ICD-10-CM

## 2024-05-08 DIAGNOSIS — F33.0 MILD EPISODE OF RECURRENT MAJOR DEPRESSIVE DISORDER: ICD-10-CM

## 2024-05-08 DIAGNOSIS — F41.0 GENERALIZED ANXIETY DISORDER WITH PANIC ATTACKS: Primary | ICD-10-CM

## 2024-05-08 PROCEDURE — 90836 PSYTX W PT W E/M 45 MIN: CPT | Mod: ,,, | Performed by: NURSE PRACTITIONER

## 2024-05-08 PROCEDURE — 3008F BODY MASS INDEX DOCD: CPT | Mod: CPTII,,, | Performed by: NURSE PRACTITIONER

## 2024-05-08 PROCEDURE — 3079F DIAST BP 80-89 MM HG: CPT | Mod: CPTII,,, | Performed by: NURSE PRACTITIONER

## 2024-05-08 PROCEDURE — 4010F ACE/ARB THERAPY RXD/TAKEN: CPT | Mod: CPTII,,, | Performed by: NURSE PRACTITIONER

## 2024-05-08 PROCEDURE — 3074F SYST BP LT 130 MM HG: CPT | Mod: CPTII,,, | Performed by: NURSE PRACTITIONER

## 2024-05-08 PROCEDURE — 1159F MED LIST DOCD IN RCRD: CPT | Mod: CPTII,,, | Performed by: NURSE PRACTITIONER

## 2024-05-08 PROCEDURE — 3044F HG A1C LEVEL LT 7.0%: CPT | Mod: CPTII,,, | Performed by: NURSE PRACTITIONER

## 2024-05-08 PROCEDURE — 99214 OFFICE O/P EST MOD 30 MIN: CPT | Mod: ,,, | Performed by: NURSE PRACTITIONER

## 2024-05-08 PROCEDURE — 1160F RVW MEDS BY RX/DR IN RCRD: CPT | Mod: CPTII,,, | Performed by: NURSE PRACTITIONER

## 2024-05-08 RX ORDER — VENLAFAXINE HYDROCHLORIDE 37.5 MG/1
37.5 CAPSULE, EXTENDED RELEASE ORAL DAILY
Qty: 14 CAPSULE | Refills: 0 | Status: SHIPPED | OUTPATIENT
Start: 2024-05-08 | End: 2024-06-17

## 2024-05-08 RX ORDER — CETIRIZINE HYDROCHLORIDE, PSEUDOEPHEDRINE HYDROCHLORIDE 5; 120 MG/1; MG/1
1 TABLET, FILM COATED, EXTENDED RELEASE ORAL 2 TIMES DAILY
Qty: 24 TABLET | Refills: 0 | Status: SHIPPED | OUTPATIENT
Start: 2024-05-08

## 2024-05-08 RX ORDER — VENLAFAXINE HYDROCHLORIDE 75 MG/1
75 CAPSULE, EXTENDED RELEASE ORAL DAILY
Qty: 30 CAPSULE | Refills: 0 | Status: SHIPPED | OUTPATIENT
Start: 2024-05-08 | End: 2024-05-30 | Stop reason: SDUPTHER

## 2024-05-08 RX ORDER — LEVOMILNACIPRAN HYDROCHLORIDE 40 MG/1
40 CAPSULE, EXTENDED RELEASE ORAL DAILY
Qty: 30 CAPSULE | Refills: 0 | Status: SHIPPED | OUTPATIENT
Start: 2024-05-08 | End: 2024-06-07

## 2024-05-08 NOTE — LETTER
May 8, 2024      [facility]  [address] [facility] [place] [address]       Patient: Mimi Davis   YOB: 1975  Date of Visit: 05/08/2024    To Whom It May Concern:    DEL Davis  was at [facility] on 05/08/2024. The patient may return to work/school on 05/09/2024 with no restrictions. If you have any questions or concerns, or if I can be of further assistance, please do not hesitate to contact me.    Sincerely,    SHELBY ThomasP,[email or web address]

## 2024-05-08 NOTE — PROGRESS NOTES
Outpatient Psychiatry Follow-Up Visit (MD/NP)    5/8/2024    Clinical Status of Patient:  Outpatient (Ambulatory)    Chief Complaint:  Mimi Davis is a 48 y.o. female who presents today for follow-up of depression and anxiety.  Met with patient.      Interim Events/Subjective Report/Content of Current Session: Is on Fetzima for depression. She has been a patient of Yuliet Rosario NP in the past. She is debating having Genesight testing done. She feels like her depression symptoms and anxiety are out of control. She was placed on Fetzima ten years ago by her primary care provider and she has felt that over time this has become ineffective. She wants to go back to college and possibly become a nurse and regrets that she may now be too old to go back. She had numerous children and has a lot of grandchildren.     Psychotherapy:  Target symptoms: depression, distractability, anxiety , adjustment, work stress  Why chosen therapy is appropriate versus another modality: relevant to diagnosis, patient responds to this modality  Outcome monitoring methods: self-report, observation, checklist/rating scale  Therapeutic intervention type: supportive psychotherapy  Topics discussed/themes: relationships difficulties, work stress, parenting issues, difficulty managing affect in interpersonal relationships, building skills sets for symptom management, symptom recognition  The patient's response to the intervention is accepting. The patient's progress toward treatment goals is limited.   Duration of intervention: 50 minutes.    Patient  reviewed this visit.     Review of Systems   PSYCHIATRIC: Pertinant items are noted in the narrative.  CONSTITUTIONAL: No weight gain or loss.   RESPIRATORY: No shortness of breath.  CARDIOVASCULAR: No tachycardia or chest pain.    Past Medical, Family and Social History: The patient's past medical, family and social history have been reviewed and updated as appropriate within the electronic  medical record - see encounter notes.    Compliance: yes    Side effects: None    Risk Parameters:  Patient reports no suicidal ideation  Patient reports no homicidal ideation  Patient reports no self-injurious behavior  Patient reports no violent behavior    Exam (detailed: at least 9 elements; comprehensive: all 15 elements)         5/8/2024    11:13 AM   Adult ADHD Self-Report Scale   How often do you have trouble wrapping up the final details of a project once the chanllenging parts have been done? 3   How often do you have difficulty getting things in order when you have to do a task that requires organization? 2   How often do you have problems remembering appointments or obligations? 3   When you have a task that requires a lot of thought, how often do you avoid or delay getting started? 3   How often do you fidget or squirm with your hands or feet when you have to sit down for a long time? 3   How often do you feel overly active and compelled to do things, like you were driven by a motor? 3   Part A Score 17   How often do you make careless mistakes when you have to work on a boring or difficult project? 1   How often do you have difficulty keeping your attention when you are doing boring or repetitive work? 1   How often do you have difficulty concentrating on what people say to you, even when they are speaking to you directly? 2   How often do you misplace or have difficulty finding things at home or at work? 3   How often are you distracted by activity or noise around you? 2   How often do you leave your seat in meetings or other situations in which you are expected to remain seated? 2   How often do you feel restless or fidgety? 2   How often do you have difficulty unwinding and relaxing when you have time to yourself? 4   How often do you find yourself talking too much when you are in social situations? 0   When you're in a conversation, how often do you find yourself finishing the sentences of the  people you are talking to before they can finish them themselves? 1   How often do you have difficulty waiting your turn in situations when turn taking is required? 2   How often do you interrupt others when they are busy? 1   Part B Score 21         5/8/2024    11:01 AM 4/12/2024     1:07 PM 1/5/2024     1:56 PM 1/5/2024     1:52 PM 10/19/2023     7:07 PM 7/25/2023     3:55 PM 9/23/2022     2:41 PM   Depression Patient Health Questionnaire   Over the last two weeks how often have you been bothered by little interest or pleasure in doing things Several days Not at all Not at all Not at all Several days Not at all Not at all   Over the last two weeks how often have you been bothered by feeling down, depressed or hopeless More than half the days Not at all Not at all Not at all Several days Not at all Not at all   PHQ-2 Total Score 3 0 0 0 2 0 0   Over the last two weeks how often have you been bothered by trouble falling or staying asleep, or sleeping too much Several days         Over the last two weeks how often have you been bothered by feeling tired or having little energy Several days         Over the last two weeks how often have you been bothered by a poor appetite or overeating More than half the days         Over the last two weeks how often have you been bothered by feeling bad about yourself - or that you are a failure or have let yourself or your family down Not at all         Over the last two weeks how often have you been bothered by trouble concentrating on things, such as reading the newspaper or watching television Several days         Over the last two weeks how often have you been bothered by moving or speaking so slowly that other people could have noticed. Or the opposite - being so fidgety or restless that you have been moving around a lot more than usual. Not at all         Over the last two weeks how often have you been bothered by thoughts that you would be better off dead, or of hurting  yourself Not at all         If you checked off any problems, how difficult have these problems made it for you to do your work, take care of things at home or get along with other people? Somewhat difficult         PHQ-9 Score 8         PHQ-9 Interpretation Mild                  5/8/2024    11:09 AM   MDQ Scale   you felt so good or so hyper that other people thought you were not your normal self or you were so hyper that you got into trouble? 0   you were so irritable that you shouted at people or started fights or arguments? 0   you felt much more self-confident than usual? 1   you got much less sleep than usual and found that you didn't really miss it? 0   you were more talkative or spoke much faster than usual? 0   thoughts raced through your head or you couldn't slow your mind down? 1   you were so easily distracted by things around you that you had trouble concentrating or staying on track? 1   you had more energy than usual? 0   you were much more active or did many more things than usual? 1   you were much more social or outgoing than usual, for example, you telephoned friends in the middle of the night? 1   you were much more interested in sex than usual? 0   you did things that were unusual for you or that other people might have thought were excessive, foolish, or risky? 0   spending money got you or your family in trouble? 0   If you checked YES to more than one of the above, have several of these ever happened during the same period of time? 1   How much of a problem did any of these cause you - like being unable to work; having family, money or legal troubles; getting into arguments or fights? Minor problem   Mood Disorder Questionnaire Score  5     SOSA-7 Questionnaire  Feeling nervous, anxious, or on edge: Nearly every day  Not being able to stop or control worrying: Nearly every day  Worrying too much about different things: Nearly every day  Trouble relaxing: Nearly every day  Being so restless that  "it is hard to sit still: Not at all  Becoming easily annoyed or irritable: Several days  Feeling afraid as if something awful might happen: Nearly every day  SOSA-7 Total Score: 16    Constitutional  Vitals:  Most recent vital signs, dated greater than 90 days prior to this appointment, were reviewed.   Vitals:    05/08/24 1058   BP: 124/82   Pulse: 81   Resp: 18   Temp: 98.2 °F (36.8 °C)   SpO2: 99%   Weight: 113.9 kg (251 lb)   Height: 5' 6" (1.676 m)        General:  age appropriate, well dressed, neatly groomed, obese     Musculoskeletal  Muscle Strength/Tone:  no spasicity, no rigidity, no cogwheeling, no flaccidity, no paratonia, no dyskinesia, no dystonia, no tremor   Gait & Station:  non-ataxic     Psychiatric  Speech:  no latency; no press   Mood & Affect:  anxious, depressed, sad  congruent and appropriate   Thought Process:  normal and logical   Associations:  intact   Thought Content:  normal, no suicidality, no homicidality, delusions, or paranoia   Insight:  intact, has awareness of illness   Judgement: behavior is adequate to circumstances   Orientation:  grossly intact   Memory: intact for content of interview   Language: grossly intact   Attention Span & Concentration:  able to focus   Fund of Knowledge:  intact and appropriate to age and level of education, familiar with aspects of current personal life     Assessment and Diagnosis   Status/Progress: Based on the examination today, the patient's problem(s) is/are worsening.  New problems have been presented today.   Co-morbidities, Diagnostic uncertainty, and Lack of compliance are not complicating management of the primary condition.  There are no active rule-out diagnoses for this patient at this time.     General Impression: Has symptoms of depression and anxiety and has been dealing with these for years. Has had little medication adjustment. Would benefit from individualized therapy. Will change from Fetzima to Venlafaxine and add clorazepate as " needed for anxiety and panic attacks.      ICD-10-CM ICD-9-CM   1. Generalized anxiety disorder with panic attacks  F41.1 300.02    F41.0 300.01   2. Mild episode of recurrent major depressive disorder  F33.0 296.31       Intervention/Counseling/Treatment Plan   Medication Management: Continue current medications. The risks and benefits of medication were discussed with the patient. Verbalized understanding.  Counseling provided with patient as follows: importance of compliance with chosen treatment options was emphasized, risks and benefits of treatment options, including medications, were discussed with the patient, risk factor reduction, prognosis, patient education, instructions for  management, treatment, and follow-up were reviewed      Medications:   Medication List with Changes/Refills   New Medications    CETIRIZINE-PSEUDOEPHEDRINE 5-120 MG TB12    Take 1 tablet by mouth 2 (two) times a day.       Start Date: 5/8/2024  End Date: --    CLORAZEPATE (TRANXENE) 3.75 MG TAB    Take 1 tablet (3.75 mg total) by mouth 2 (two) times daily as needed (anxiety).       Start Date: 5/17/2024 End Date: --    LEVOMILNACIPRAN (FETZIMA) 40 MG CS24    Take 40 mg by mouth once daily.       Start Date: 5/8/2024  End Date: 6/7/2024    VENLAFAXINE (EFFEXOR-XR) 37.5 MG 24 HR CAPSULE    Take 1 capsule (37.5 mg total) by mouth once daily. Then begin 75 mg. for 14 days       Start Date: 5/8/2024  End Date: 5/22/2024    VENLAFAXINE (EFFEXOR-XR) 75 MG 24 HR CAPSULE    Take 1 capsule (75 mg total) by mouth once daily.       Start Date: 5/8/2024  End Date: 6/7/2024   Current Medications    CARVEDILOL (COREG) 25 MG TABLET    Take 25 mg by mouth 2 (two) times daily.       Start Date: 9/10/2023 End Date: --    CLONIDINE (CATAPRES) 0.1 MG TABLET    Take 0.1 mg by mouth 2 (two) times daily. Prn High BP       Start Date: --        End Date: --    FLUTICASONE PROPIONATE (FLONASE) 50 MCG/ACTUATION NASAL SPRAY    2 sprays (100 mcg total) by Each  Nostril route once daily.       Start Date: 1/5/2024  End Date: --    HYDROCHLOROTHIAZIDE (HYDRODIURIL) 25 MG TABLET    Take 25 mg by mouth.       Start Date: 9/12/2023 End Date: --    LEVOMILNACIPRAN (FETZIMA) 120 MG CS24    Take 120 mg by mouth once daily.       Start Date: 7/25/2023 End Date: --    LINACLOTIDE (LINZESS) 145 MCG CAP CAPSULE    Take 1 capsule (145 mcg total) by mouth before breakfast.       Start Date: 1/5/2024  End Date: --    MELOXICAM (MOBIC) 7.5 MG TABLET    Take 1 tablet (7.5 mg total) by mouth once daily.       Start Date: 1/5/2024  End Date: --    SEMAGLUTIDE (OZEMPIC) 1 MG/DOSE (4 MG/3 ML)    Inject 1 mg into the skin every 7 days.       Start Date: 1/5/2024  End Date: 1/4/2025    VALACYCLOVIR (VALTREX) 500 MG TABLET    Take 1 tablet (500 mg total) by mouth 2 (two) times daily.       Start Date: 1/5/2024  End Date: --    VALSARTAN (DIOVAN) 160 MG TABLET    Take 320 mg by mouth.       Start Date: 10/4/2023 End Date: --       Return to Clinic: 1 month    Patient instructed to please go to emergency department if feeling as though you are going to harm to yourself or others or if you are in crisis; or to please call the clinic to report any worsening of symptoms or problems associated with medication.     Total Time: 66 minutes

## 2024-05-17 RX ORDER — CLORAZEPATE DIPOTASSIUM 3.75 MG/1
3.75 TABLET ORAL 2 TIMES DAILY PRN
Qty: 30 TABLET | Refills: 0 | Status: SHIPPED | OUTPATIENT
Start: 2024-05-17

## 2024-05-21 PROBLEM — F41.1 GENERALIZED ANXIETY DISORDER WITH PANIC ATTACKS: Status: ACTIVE | Noted: 2024-05-21

## 2024-05-21 PROBLEM — F33.0 MILD EPISODE OF RECURRENT MAJOR DEPRESSIVE DISORDER: Status: ACTIVE | Noted: 2024-05-21

## 2024-05-21 PROBLEM — F41.0 GENERALIZED ANXIETY DISORDER WITH PANIC ATTACKS: Status: ACTIVE | Noted: 2024-05-21

## 2024-05-31 RX ORDER — VENLAFAXINE HYDROCHLORIDE 75 MG/1
75 CAPSULE, EXTENDED RELEASE ORAL DAILY
Qty: 30 CAPSULE | Refills: 0 | Status: SHIPPED | OUTPATIENT
Start: 2024-05-31 | End: 2024-06-17 | Stop reason: SDUPTHER

## 2024-06-03 DIAGNOSIS — M25.50 ARTHRALGIA, UNSPECIFIED JOINT: ICD-10-CM

## 2024-06-03 RX ORDER — MELOXICAM 7.5 MG/1
7.5 TABLET ORAL
Qty: 90 TABLET | Refills: 0 | Status: SHIPPED | OUTPATIENT
Start: 2024-06-03

## 2024-06-17 ENCOUNTER — OFFICE VISIT (OUTPATIENT)
Dept: BEHAVIORAL HEALTH | Facility: CLINIC | Age: 49
End: 2024-06-17
Payer: COMMERCIAL

## 2024-06-17 VITALS
HEIGHT: 66 IN | RESPIRATION RATE: 18 BRPM | BODY MASS INDEX: 40.82 KG/M2 | DIASTOLIC BLOOD PRESSURE: 78 MMHG | TEMPERATURE: 99 F | HEART RATE: 87 BPM | WEIGHT: 254 LBS | SYSTOLIC BLOOD PRESSURE: 124 MMHG | OXYGEN SATURATION: 99 %

## 2024-06-17 DIAGNOSIS — F33.0 MILD EPISODE OF RECURRENT MAJOR DEPRESSIVE DISORDER: Primary | ICD-10-CM

## 2024-06-17 DIAGNOSIS — F41.1 GENERALIZED ANXIETY DISORDER WITH PANIC ATTACKS: ICD-10-CM

## 2024-06-17 DIAGNOSIS — F41.0 GENERALIZED ANXIETY DISORDER WITH PANIC ATTACKS: ICD-10-CM

## 2024-06-17 PROCEDURE — 3074F SYST BP LT 130 MM HG: CPT | Mod: CPTII,,, | Performed by: NURSE PRACTITIONER

## 2024-06-17 PROCEDURE — 90833 PSYTX W PT W E/M 30 MIN: CPT | Mod: ,,, | Performed by: NURSE PRACTITIONER

## 2024-06-17 PROCEDURE — 3044F HG A1C LEVEL LT 7.0%: CPT | Mod: CPTII,,, | Performed by: NURSE PRACTITIONER

## 2024-06-17 PROCEDURE — 3078F DIAST BP <80 MM HG: CPT | Mod: CPTII,,, | Performed by: NURSE PRACTITIONER

## 2024-06-17 PROCEDURE — 1160F RVW MEDS BY RX/DR IN RCRD: CPT | Mod: CPTII,,, | Performed by: NURSE PRACTITIONER

## 2024-06-17 PROCEDURE — 4010F ACE/ARB THERAPY RXD/TAKEN: CPT | Mod: CPTII,,, | Performed by: NURSE PRACTITIONER

## 2024-06-17 PROCEDURE — 3008F BODY MASS INDEX DOCD: CPT | Mod: CPTII,,, | Performed by: NURSE PRACTITIONER

## 2024-06-17 PROCEDURE — 1159F MED LIST DOCD IN RCRD: CPT | Mod: CPTII,,, | Performed by: NURSE PRACTITIONER

## 2024-06-17 PROCEDURE — 99214 OFFICE O/P EST MOD 30 MIN: CPT | Mod: ,,, | Performed by: NURSE PRACTITIONER

## 2024-06-17 RX ORDER — VENLAFAXINE HYDROCHLORIDE 75 MG/1
75 CAPSULE, EXTENDED RELEASE ORAL DAILY
Qty: 90 CAPSULE | Refills: 3 | Status: SHIPPED | OUTPATIENT
Start: 2024-06-17 | End: 2025-06-12

## 2024-06-17 RX ORDER — LEVOMILNACIPRAN HYDROCHLORIDE 40 MG/1
40 CAPSULE, EXTENDED RELEASE ORAL DAILY
Qty: 90 CAPSULE | Refills: 3 | Status: SHIPPED | OUTPATIENT
Start: 2024-06-17 | End: 2025-06-12

## 2024-06-17 NOTE — PROGRESS NOTES
"Outpatient Psychiatry Follow-Up Visit (MD/NP)    6/17/2024    Clinical Status of Patient:  Outpatient (Ambulatory)    Chief Complaint:  Mimi Davis is a 48 y.o. female who presents today for follow-up of depression and anxiety.  Met with patient.      Interim Events/Subjective Report/Content of Current Session: Daughter got  recently and that has lifted a huge weight off of her shoulders. She feels good about everything. She has felt better at work and is happy with where she is in life. She has "fallen" back in love with her . Still often feels like she isn't enough and has to do something to have some sort of identity. Her relationship with her daughter improved a lot. Discussed the nature of anxiety and the physiological responses and how to control them through the use of coping skills and mechanisms. Discussed that she doesn't need external self validation from a regulatory body to be someone. She is embracing this and has been practicing mindfulness.      Psychotherapy:  Target symptoms: recurrent depression, anxiety   Why chosen therapy is appropriate versus another modality: relevant to diagnosis, patient responds to this modality  Outcome monitoring methods: self-report, observation, checklist/rating scale  Therapeutic intervention type: supportive psychotherapy  Topics discussed/themes: relationships difficulties, difficulty managing affect in interpersonal relationships, building skills sets for symptom management, symptom recognition  The patient's response to the intervention is accepting. The patient's progress toward treatment goals is good.   Duration of intervention: 26 minutes.      Patient  reviewed this visit.     Review of Systems   PSYCHIATRIC: Pertinant items are noted in the narrative.  CONSTITUTIONAL: No weight gain or loss.   RESPIRATORY: No shortness of breath.  CARDIOVASCULAR: No tachycardia or chest pain.    Past Medical, Family and Social History: The patient's " past medical, family and social history have been reviewed and updated as appropriate within the electronic medical record - see encounter notes.    Compliance: yes    Side effects: None    Risk Parameters:  Patient reports no suicidal ideation  Patient reports no homicidal ideation  Patient reports no self-injurious behavior  Patient reports no violent behavior    Exam (detailed: at least 9 elements; comprehensive: all 15 elements)     PHQ-9: Little interest or pleasure in doing things: (P) Several days  Feeling down, depressed, or hopeless: (P) Several days  Trouble falling or staying asleep, or sleeping too much: (P) Not at all  Feeling tired or having little energy: (P) Several days  Poor appetite or overeating: (P) Several days  Feeling bad about yourself - or that you are a failure or have let yourself or your family down: (P) Several days  Trouble concentrating on things, such as reading the newspaper or watching television: (P) Several days  Moving or speaking so slowly that other people could have noticed. Or the opposite - being so fidgety or restless that you have been moving around a lot more than usual: (P) Not at all  Thoughts that you would be better off dead, or of hurting yourself in some way: (P) Not at all  PHQ-9 Total Score: (P) 6  If you checked off any problems, how difficult have these problems made it for you to do your work, take care of things at home, or get along with other people?: (P) Somewhat difficult  PHQ-9 Total Score: (P) 6    SOSA-7: SOSA-7 Questionnaire  Feeling nervous, anxious, or on edge: Several days  Not being able to stop or control worrying: Several days  Worrying too much about different things: Several days  Trouble relaxing: Several days  Being so restless that it is hard to sit still: Not at all  Becoming easily annoyed or irritable: Several days  Feeling afraid as if something awful might happen: Several days  SOSA-7 Total Score: 6           Constitutional  Vitals:  Most  "recent vital signs, dated greater than 90 days prior to this appointment, were reviewed.   Vitals:    06/17/24 1538   BP: 124/78   Pulse: 87   Resp: 18   Temp: 98.7 °F (37.1 °C)   SpO2: 99%   Weight: 115.2 kg (254 lb)   Height: 5' 6" (1.676 m)   Body mass index is 41 kg/m².       General:  age appropriate, well nourished, casually dressed, neatly groomed, obese     Musculoskeletal  Muscle Strength/Tone:  no spasicity, no rigidity, no cogwheeling, no flaccidity, no paratonia, no dyskinesia, no dystonia   Gait & Station:  non-ataxic     Psychiatric  Speech:  no latency; no press   Mood & Affect:  steady  congruent and appropriate   Thought Process:  normal and logical   Associations:  intact   Thought Content:  normal, no suicidality, no homicidality, delusions, or paranoia   Insight:  intact, has awareness of illness   Judgement: behavior is adequate to circumstances   Orientation:  grossly intact   Memory: intact for content of interview   Language: grossly intact   Attention Span & Concentration:  able to focus   Fund of Knowledge:  intact and appropriate to age and level of education, familiar with aspects of current personal life     Assessment and Diagnosis   Status/Progress: Based on the examination today, the patient's problem(s) is/are improved and resolving.  New problems have not been presented today.   Co-morbidities, Diagnostic uncertainty, and Lack of compliance are not complicating management of the primary condition.  There are no active rule-out diagnoses for this patient at this time.     General Impression: Has had marked improvement in all symptom domains. Blood pressure continues to improve as well. Has made excellent progress within self acceptance and great progress with therapy. Will continue Fetzima and Venlafaxine at current doses. Will taper Fetzima down to 20 mg in 3 months.      ICD-10-CM ICD-9-CM   1. Mild episode of recurrent major depressive disorder  F33.0 296.31   2. Generalized " anxiety disorder with panic attacks  F41.1 300.02    F41.0 300.01       Intervention/Counseling/Treatment Plan   Medication Management: Continue current medications. The risks and benefits of medication were discussed with the patient. Verbalized understanding.  Counseling provided with patient as follows: importance of compliance with chosen treatment options was emphasized, risks and benefits of treatment options, including medications, were discussed with the patient, risk factor reduction, prognosis, patient education, instructions for  management, treatment, and follow-up were reviewed      Medications:   Medication List with Changes/Refills   New Medications    LEVOMILNACIPRAN (FETZIMA) 40 MG CS24    Take 40 mg by mouth once daily.       Start Date: 6/17/2024 End Date: 6/12/2025   Current Medications    CARVEDILOL (COREG) 25 MG TABLET    Take 25 mg by mouth 2 (two) times daily.       Start Date: 9/10/2023 End Date: --    CETIRIZINE-PSEUDOEPHEDRINE 5-120 MG TB12    Take 1 tablet by mouth 2 (two) times a day.       Start Date: 5/8/2024  End Date: --    CLONIDINE (CATAPRES) 0.1 MG TABLET    Take 0.1 mg by mouth 2 (two) times daily. Prn High BP       Start Date: --        End Date: --    CLORAZEPATE (TRANXENE) 3.75 MG TAB    Take 1 tablet (3.75 mg total) by mouth 2 (two) times daily as needed (anxiety).       Start Date: 5/17/2024 End Date: --    FLUTICASONE PROPIONATE (FLONASE) 50 MCG/ACTUATION NASAL SPRAY    2 sprays (100 mcg total) by Each Nostril route once daily.       Start Date: 1/5/2024  End Date: --    HYDROCHLOROTHIAZIDE (HYDRODIURIL) 25 MG TABLET    Take 25 mg by mouth.       Start Date: 9/12/2023 End Date: --    LINACLOTIDE (LINZESS) 145 MCG CAP CAPSULE    Take 1 capsule (145 mcg total) by mouth before breakfast.       Start Date: 1/5/2024  End Date: --    MELOXICAM (MOBIC) 7.5 MG TABLET    Take 1 tablet by mouth once daily       Start Date: 6/3/2024  End Date: --    SEMAGLUTIDE (OZEMPIC) 1 MG/DOSE (4  MG/3 ML)    Inject 1 mg into the skin every 7 days.       Start Date: 1/5/2024  End Date: 1/4/2025    VALACYCLOVIR (VALTREX) 500 MG TABLET    Take 1 tablet (500 mg total) by mouth 2 (two) times daily.       Start Date: 1/5/2024  End Date: --    VALSARTAN (DIOVAN) 160 MG TABLET    Take 320 mg by mouth.       Start Date: 10/4/2023 End Date: --   Changed and/or Refilled Medications    Modified Medication Previous Medication    VENLAFAXINE (EFFEXOR-XR) 75 MG 24 HR CAPSULE venlafaxine (EFFEXOR-XR) 75 MG 24 hr capsule       Take 1 capsule (75 mg total) by mouth once daily.    Take 1 capsule (75 mg total) by mouth once daily.       Start Date: 6/17/2024 End Date: 6/12/2025    Start Date: 5/31/2024 End Date: 6/17/2024   Discontinued Medications    LEVOMILNACIPRAN (FETZIMA) 120 MG CS24    Take 120 mg by mouth once daily.       Start Date: 7/25/2023 End Date: 6/17/2024    VENLAFAXINE (EFFEXOR-XR) 37.5 MG 24 HR CAPSULE    Take 1 capsule (37.5 mg total) by mouth once daily. Then begin 75 mg. for 14 days       Start Date: 5/8/2024  End Date: 6/17/2024     Return to Clinic: 3 months    Patient instructed to please go to emergency department if feeling as though you are going to harm to yourself or others or if you are in crisis; or to please call the clinic to report any worsening of symptoms or problems associated with medication.     Total Time: 44 minutes

## 2024-09-18 ENCOUNTER — OFFICE VISIT (OUTPATIENT)
Dept: BEHAVIORAL HEALTH | Facility: CLINIC | Age: 49
End: 2024-09-18
Payer: COMMERCIAL

## 2024-09-18 VITALS
SYSTOLIC BLOOD PRESSURE: 126 MMHG | BODY MASS INDEX: 42.11 KG/M2 | RESPIRATION RATE: 18 BRPM | WEIGHT: 262 LBS | TEMPERATURE: 98 F | OXYGEN SATURATION: 98 % | HEART RATE: 71 BPM | HEIGHT: 66 IN | DIASTOLIC BLOOD PRESSURE: 74 MMHG

## 2024-09-18 DIAGNOSIS — F41.1 GENERALIZED ANXIETY DISORDER WITH PANIC ATTACKS: Primary | ICD-10-CM

## 2024-09-18 DIAGNOSIS — F33.0 MILD EPISODE OF RECURRENT MAJOR DEPRESSIVE DISORDER: ICD-10-CM

## 2024-09-18 DIAGNOSIS — F41.0 GENERALIZED ANXIETY DISORDER WITH PANIC ATTACKS: Primary | ICD-10-CM

## 2024-09-18 DIAGNOSIS — Z79.899 ENCOUNTER FOR LONG-TERM (CURRENT) USE OF OTHER MEDICATIONS: ICD-10-CM

## 2024-09-18 LAB

## 2024-09-18 PROCEDURE — 99214 OFFICE O/P EST MOD 30 MIN: CPT | Mod: ,,, | Performed by: NURSE PRACTITIONER

## 2024-09-18 PROCEDURE — 3008F BODY MASS INDEX DOCD: CPT | Mod: CPTII,,, | Performed by: NURSE PRACTITIONER

## 2024-09-18 PROCEDURE — 1159F MED LIST DOCD IN RCRD: CPT | Mod: CPTII,,, | Performed by: NURSE PRACTITIONER

## 2024-09-18 PROCEDURE — 90833 PSYTX W PT W E/M 30 MIN: CPT | Mod: ,,, | Performed by: NURSE PRACTITIONER

## 2024-09-18 PROCEDURE — 4010F ACE/ARB THERAPY RXD/TAKEN: CPT | Mod: CPTII,,, | Performed by: NURSE PRACTITIONER

## 2024-09-18 PROCEDURE — 3044F HG A1C LEVEL LT 7.0%: CPT | Mod: CPTII,,, | Performed by: NURSE PRACTITIONER

## 2024-09-18 PROCEDURE — 3074F SYST BP LT 130 MM HG: CPT | Mod: CPTII,,, | Performed by: NURSE PRACTITIONER

## 2024-09-18 PROCEDURE — 3078F DIAST BP <80 MM HG: CPT | Mod: CPTII,,, | Performed by: NURSE PRACTITIONER

## 2024-09-18 PROCEDURE — G2211 COMPLEX E/M VISIT ADD ON: HCPCS | Mod: ,,, | Performed by: NURSE PRACTITIONER

## 2024-09-18 PROCEDURE — 80305 DRUG TEST PRSMV DIR OPT OBS: CPT | Mod: QW,,, | Performed by: NURSE PRACTITIONER

## 2024-09-18 PROCEDURE — 1160F RVW MEDS BY RX/DR IN RCRD: CPT | Mod: CPTII,,, | Performed by: NURSE PRACTITIONER

## 2024-09-18 RX ORDER — VALSARTAN 320 MG/1
320 TABLET ORAL
COMMUNITY
Start: 2024-08-26

## 2024-09-26 NOTE — PROGRESS NOTES
Outpatient Psychiatry Follow-Up Visit (MD/NP)    9/18/2024    Clinical Status of Patient:  Outpatient (Ambulatory)    Chief Complaint:  Mimi Davis is a 49 y.o. female who presents today for follow-up of depression, anxiety, and adjustment problems.  Met with patient.      Interim Events/Subjective Report/Content of Current Session: Has felt like she is doing much better. Work is stressful, but has continued to get better. She is getting better at managing situational anxiety. Discussed the nature of anxiety and the physiological responses and how to control them through the use of coping skills and mechanisms. Discussed and recommended practicing mindfulness and meditation to limit stressors, anxiety, and depression symptoms.      Psychotherapy:  Target symptoms: recurrent depression, anxiety , adjustment, work stress  Why chosen therapy is appropriate versus another modality: relevant to diagnosis, patient responds to this modality  Outcome monitoring methods: self-report, observation, checklist/rating scale  Therapeutic intervention type: supportive psychotherapy  Topics discussed/themes: relationships difficulties, work stress, parenting issues, difficulty managing affect in interpersonal relationships, building skills sets for symptom management, symptom recognition  The patient's response to the intervention is accepting. The patient's progress toward treatment goals is good.   Duration of intervention: 24 minutes.      Patient  reviewed this visit.     Review of Systems   PSYCHIATRIC: Pertinant items are noted in the narrative.  CONSTITUTIONAL: No weight gain or loss.   RESPIRATORY: No shortness of breath.  CARDIOVASCULAR: No tachycardia or chest pain.    Past Medical, Family and Social History: The patient's past medical, family and social history have been reviewed and updated as appropriate within the electronic medical record - see encounter notes.    Perception of medication efficacy: Working  very well.  Medications tried and failed: See history.    Compliance: yes    Side effects: None    Risk Parameters:  Patient reports no suicidal ideation  Patient reports no homicidal ideation  Patient reports no self-injurious behavior  Patient reports no violent behavior    Exam (detailed: at least 9 elements; comprehensive: all 15 elements)     PHQ-9: Little interest or pleasure in doing things: (P) Several days  Feeling down, depressed, or hopeless: (P) Several days  Trouble falling or staying asleep, or sleeping too much: (P) Several days  Feeling tired or having little energy: (P) Several days  Poor appetite or overeating: (P) Not at all  Feeling bad about yourself - or that you are a failure or have let yourself or your family down: (P) Not at all  Trouble concentrating on things, such as reading the newspaper or watching television: (P) Not at all  Moving or speaking so slowly that other people could have noticed. Or the opposite - being so fidgety or restless that you have been moving around a lot more than usual: (P) Not at all  Thoughts that you would be better off dead, or of hurting yourself in some way: (P) Not at all  PHQ-9 Total Score: (P) 4  If you checked off any problems, how difficult have these problems made it for you to do your work, take care of things at home, or get along with other people?: (P) Not difficult at all  PHQ-9 Total Score: (P) 4    SOSA-7: SOSA-7 Questionnaire  Feeling nervous, anxious, or on edge: Several days  Not being able to stop or control worrying: Several days  Worrying too much about different things: Several days  Trouble relaxing: Several days  Being so restless that it is hard to sit still: Not at all  Becoming easily annoyed or irritable: Several days  Feeling afraid as if something awful might happen: Several days  SOSA-7 Total Score: 6    Constitutional  Vitals:  Most recent vital signs, dated greater than 90 days prior to this appointment, were reviewed.   Vitals:  "   09/18/24 1540   BP: 126/74   Pulse: 71   Resp: 18   Temp: 98.1 °F (36.7 °C)   SpO2: 98%   Weight: 118.8 kg (262 lb)   Height: 5' 6" (1.676 m)   Body mass index is 42.29 kg/m².     General:  age appropriate, well nourished, casually dressed, neatly groomed, obese     Musculoskeletal  Muscle Strength/Tone:  no spasicity, no rigidity, no cogwheeling, no flaccidity, no paratonia   Gait & Station:  non-ataxic     Psychiatric  Speech:  no latency; no press   Mood & Affect:  steady  congruent and appropriate   Thought Process:  normal and logical   Associations:  intact   Thought Content:  normal, no suicidality, no homicidality, delusions, or paranoia   Insight:  intact, has awareness of illness   Judgement: behavior is adequate to circumstances   Orientation:  grossly intact   Memory: intact for content of interview   Language: grossly intact   Attention Span & Concentration:  able to focus   Fund of Knowledge:  intact and appropriate to age and level of education, familiar with aspects of current personal life, 4 of 4 recent presidents     Assessment and Diagnosis   Status/Progress: Based on the examination today, the patient's problem(s) is/are improved and resolving.  New problems have not been presented today.   Co-morbidities, Diagnostic uncertainty, and Lack of compliance are complicating management of the primary condition.  There are no active rule-out diagnoses for this patient at this time.     General Impression: Has symptom improvement in all domains. Will continue current medication regimen. Has progressed very well through therapy.      ICD-10-CM ICD-9-CM   1. Generalized anxiety disorder with panic attacks  F41.1 300.02    F41.0 300.01   2. Mild episode of recurrent major depressive disorder  F33.0 296.31   3. Encounter for long-term (current) use of other medications  Z79.899 V58.69       Intervention/Counseling/Treatment Plan   Medication Management: Continue current medications. The risks and " benefits of medication were discussed with the patient. Verbalized understanding.  Counseling provided with patient as follows: importance of compliance with chosen treatment options was emphasized, risks and benefits of treatment options, including medications, were discussed with the patient, risk factor reduction, prognosis, patient education, instructions for  management, treatment, and follow-up were reviewed  Visit today included increased complexity associated with the care of the episodic problem Generalized Anxiety Disorder addressed and managing the longitudinal care of the patient due to the serious and/or complex managed problem(s) Panic attacks, Mild Major Depressive Disorder.    Medications:   Medication List with Changes/Refills   Current Medications    CARVEDILOL (COREG) 25 MG TABLET    Take 25 mg by mouth 2 (two) times daily.       Start Date: 9/10/2023 End Date: --    CETIRIZINE-PSEUDOEPHEDRINE 5-120 MG TB12    Take 1 tablet by mouth 2 (two) times a day.       Start Date: 5/8/2024  End Date: --    CLONIDINE (CATAPRES) 0.1 MG TABLET    Take 0.1 mg by mouth 2 (two) times daily. Prn High BP       Start Date: --        End Date: --    CLORAZEPATE (TRANXENE) 3.75 MG TAB    Take 1 tablet (3.75 mg total) by mouth 2 (two) times daily as needed (anxiety).       Start Date: 5/17/2024 End Date: --    FLUTICASONE PROPIONATE (FLONASE) 50 MCG/ACTUATION NASAL SPRAY    2 sprays (100 mcg total) by Each Nostril route once daily.       Start Date: 1/5/2024  End Date: --    HYDROCHLOROTHIAZIDE (HYDRODIURIL) 25 MG TABLET    Take 25 mg by mouth.       Start Date: 9/12/2023 End Date: --    LEVOMILNACIPRAN (FETZIMA) 40 MG CS24    Take 40 mg by mouth once daily.       Start Date: 6/17/2024 End Date: 6/12/2025    LINACLOTIDE (LINZESS) 145 MCG CAP CAPSULE    Take 1 capsule (145 mcg total) by mouth before breakfast.       Start Date: 1/5/2024  End Date: --    MELOXICAM (MOBIC) 7.5 MG TABLET    Take 1 tablet by mouth once  daily       Start Date: 6/3/2024  End Date: --    SEMAGLUTIDE (OZEMPIC) 1 MG/DOSE (4 MG/3 ML)    Inject 1 mg into the skin every 7 days.       Start Date: 1/5/2024  End Date: 1/4/2025    VALACYCLOVIR (VALTREX) 500 MG TABLET    Take 1 tablet (500 mg total) by mouth 2 (two) times daily.       Start Date: 1/5/2024  End Date: --    VALSARTAN (DIOVAN) 160 MG TABLET    Take 320 mg by mouth.       Start Date: 10/4/2023 End Date: --    VALSARTAN (DIOVAN) 320 MG TABLET    Take 320 mg by mouth.       Start Date: 8/26/2024 End Date: --    VENLAFAXINE (EFFEXOR-XR) 75 MG 24 HR CAPSULE    Take 1 capsule (75 mg total) by mouth once daily.       Start Date: 6/17/2024 End Date: 6/12/2025     Return to Clinic: 6 months    Patient instructed to please go to emergency department if feeling as though you are going to harm to yourself or others or if you are in crisis; or to please call the clinic to report any worsening of symptoms or problems associated with medication.     Total Time: 44 minutes

## 2024-10-04 ENCOUNTER — OFFICE VISIT (OUTPATIENT)
Dept: FAMILY MEDICINE | Facility: CLINIC | Age: 49
End: 2024-10-04
Payer: COMMERCIAL

## 2024-10-04 VITALS
OXYGEN SATURATION: 98 % | RESPIRATION RATE: 18 BRPM | HEART RATE: 78 BPM | WEIGHT: 261.94 LBS | HEIGHT: 66 IN | DIASTOLIC BLOOD PRESSURE: 60 MMHG | BODY MASS INDEX: 42.1 KG/M2 | SYSTOLIC BLOOD PRESSURE: 100 MMHG | TEMPERATURE: 97 F

## 2024-10-04 DIAGNOSIS — E11.9 TYPE 2 DIABETES MELLITUS WITHOUT COMPLICATION, WITHOUT LONG-TERM CURRENT USE OF INSULIN: ICD-10-CM

## 2024-10-04 DIAGNOSIS — J01.90 ACUTE NON-RECURRENT SINUSITIS, UNSPECIFIED LOCATION: Primary | ICD-10-CM

## 2024-10-04 LAB — GLUCOSE SERPL-MCNC: 207 MG/DL (ref 70–110)

## 2024-10-04 PROCEDURE — 99213 OFFICE O/P EST LOW 20 MIN: CPT | Mod: 25,,, | Performed by: NURSE PRACTITIONER

## 2024-10-04 PROCEDURE — 3074F SYST BP LT 130 MM HG: CPT | Mod: ,,, | Performed by: NURSE PRACTITIONER

## 2024-10-04 PROCEDURE — 4010F ACE/ARB THERAPY RXD/TAKEN: CPT | Mod: ,,, | Performed by: NURSE PRACTITIONER

## 2024-10-04 PROCEDURE — 96372 THER/PROPH/DIAG INJ SC/IM: CPT | Mod: ,,, | Performed by: NURSE PRACTITIONER

## 2024-10-04 PROCEDURE — 3044F HG A1C LEVEL LT 7.0%: CPT | Mod: ,,, | Performed by: NURSE PRACTITIONER

## 2024-10-04 PROCEDURE — 3078F DIAST BP <80 MM HG: CPT | Mod: ,,, | Performed by: NURSE PRACTITIONER

## 2024-10-04 PROCEDURE — 82962 GLUCOSE BLOOD TEST: CPT | Mod: QW,,, | Performed by: NURSE PRACTITIONER

## 2024-10-04 PROCEDURE — 3008F BODY MASS INDEX DOCD: CPT | Mod: ,,, | Performed by: NURSE PRACTITIONER

## 2024-10-04 RX ORDER — DEXAMETHASONE SODIUM PHOSPHATE 4 MG/ML
8 INJECTION, SOLUTION INTRA-ARTICULAR; INTRALESIONAL; INTRAMUSCULAR; INTRAVENOUS; SOFT TISSUE
Status: DISCONTINUED | OUTPATIENT
Start: 2024-10-04 | End: 2024-10-04

## 2024-10-04 RX ORDER — CEFTRIAXONE 1 G/1
1 INJECTION, POWDER, FOR SOLUTION INTRAMUSCULAR; INTRAVENOUS
Status: COMPLETED | OUTPATIENT
Start: 2024-10-04 | End: 2024-10-04

## 2024-10-04 RX ORDER — METHYLPREDNISOLONE 4 MG/1
TABLET ORAL
Qty: 21 TABLET | Refills: 0 | Status: SHIPPED | OUTPATIENT
Start: 2024-10-04

## 2024-10-04 RX ORDER — AMOXICILLIN AND CLAVULANATE POTASSIUM 875; 125 MG/1; MG/1
1 TABLET, FILM COATED ORAL 2 TIMES DAILY
Qty: 20 TABLET | Refills: 0 | Status: SHIPPED | OUTPATIENT
Start: 2024-10-04 | End: 2024-10-14

## 2024-10-04 RX ADMIN — CEFTRIAXONE 1 G: 1 INJECTION, POWDER, FOR SOLUTION INTRAMUSCULAR; INTRAVENOUS at 04:10

## 2024-10-25 ENCOUNTER — OFFICE VISIT (OUTPATIENT)
Dept: FAMILY MEDICINE | Facility: CLINIC | Age: 49
End: 2024-10-25
Payer: COMMERCIAL

## 2024-10-25 VITALS
SYSTOLIC BLOOD PRESSURE: 120 MMHG | WEIGHT: 254 LBS | OXYGEN SATURATION: 98 % | TEMPERATURE: 98 F | HEART RATE: 85 BPM | DIASTOLIC BLOOD PRESSURE: 76 MMHG | BODY MASS INDEX: 41 KG/M2

## 2024-10-25 DIAGNOSIS — R10.9 ABDOMINAL PAIN, UNSPECIFIED ABDOMINAL LOCATION: ICD-10-CM

## 2024-10-25 DIAGNOSIS — M62.838 MUSCLE SPASM: Primary | ICD-10-CM

## 2024-10-25 DIAGNOSIS — N30.00 ACUTE CYSTITIS WITHOUT HEMATURIA: ICD-10-CM

## 2024-10-25 DIAGNOSIS — K59.00 CONSTIPATION, UNSPECIFIED CONSTIPATION TYPE: ICD-10-CM

## 2024-10-25 LAB
BILIRUB SERPL-MCNC: NEGATIVE MG/DL
BLOOD URINE, POC: NEGATIVE
CLARITY, UA: CLEAR
COLOR, UA: YELLOW
GLUCOSE UR QL STRIP: NEGATIVE
KETONES UR QL STRIP: NEGATIVE
LEUKOCYTE ESTERASE URINE, POC: NEGATIVE
NITRITE, POC UA: NEGATIVE
PH, POC UA: 5.5
PROTEIN, POC: NEGATIVE
SPECIFIC GRAVITY, POC UA: >=1.03
UROBILINOGEN, POC UA: 0.2

## 2024-10-25 PROCEDURE — 1160F RVW MEDS BY RX/DR IN RCRD: CPT | Mod: CPTII,,, | Performed by: FAMILY MEDICINE

## 2024-10-25 PROCEDURE — 99214 OFFICE O/P EST MOD 30 MIN: CPT | Mod: 25,,, | Performed by: FAMILY MEDICINE

## 2024-10-25 PROCEDURE — 1159F MED LIST DOCD IN RCRD: CPT | Mod: CPTII,,, | Performed by: FAMILY MEDICINE

## 2024-10-25 PROCEDURE — 4010F ACE/ARB THERAPY RXD/TAKEN: CPT | Mod: CPTII,,, | Performed by: FAMILY MEDICINE

## 2024-10-25 PROCEDURE — 96372 THER/PROPH/DIAG INJ SC/IM: CPT | Mod: ,,, | Performed by: FAMILY MEDICINE

## 2024-10-25 PROCEDURE — 81003 URINALYSIS AUTO W/O SCOPE: CPT | Mod: QW,,, | Performed by: FAMILY MEDICINE

## 2024-10-25 PROCEDURE — 3008F BODY MASS INDEX DOCD: CPT | Mod: CPTII,,, | Performed by: FAMILY MEDICINE

## 2024-10-25 PROCEDURE — 3044F HG A1C LEVEL LT 7.0%: CPT | Mod: CPTII,,, | Performed by: FAMILY MEDICINE

## 2024-10-25 PROCEDURE — 3074F SYST BP LT 130 MM HG: CPT | Mod: CPTII,,, | Performed by: FAMILY MEDICINE

## 2024-10-25 PROCEDURE — 3078F DIAST BP <80 MM HG: CPT | Mod: CPTII,,, | Performed by: FAMILY MEDICINE

## 2024-10-25 RX ORDER — POLYETHYLENE GLYCOL 3350 17 G/17G
POWDER, FOR SOLUTION ORAL
Qty: 507 G | Refills: 0 | Status: SHIPPED | OUTPATIENT
Start: 2024-10-25

## 2024-10-25 RX ORDER — TIZANIDINE 4 MG/1
4 TABLET ORAL 3 TIMES DAILY PRN
Qty: 20 TABLET | Refills: 0 | Status: SHIPPED | OUTPATIENT
Start: 2024-10-25 | End: 2024-11-04

## 2024-10-25 RX ORDER — IBUPROFEN 600 MG/1
600 TABLET ORAL EVERY 6 HOURS PRN
Qty: 20 TABLET | Refills: 0 | Status: SHIPPED | OUTPATIENT
Start: 2024-10-25

## 2024-10-25 RX ORDER — PREDNISONE 10 MG/1
10 TABLET ORAL DAILY
Qty: 5 TABLET | Refills: 0 | Status: SHIPPED | OUTPATIENT
Start: 2024-10-25 | End: 2024-10-30

## 2024-10-25 RX ORDER — DEXAMETHASONE SODIUM PHOSPHATE 4 MG/ML
4 INJECTION, SOLUTION INTRA-ARTICULAR; INTRALESIONAL; INTRAMUSCULAR; INTRAVENOUS; SOFT TISSUE
Status: COMPLETED | OUTPATIENT
Start: 2024-10-25 | End: 2024-10-25

## 2024-10-25 RX ORDER — NITROFURANTOIN 25; 75 MG/1; MG/1
100 CAPSULE ORAL 2 TIMES DAILY
Qty: 10 CAPSULE | Refills: 0 | Status: SHIPPED | OUTPATIENT
Start: 2024-10-25 | End: 2024-10-30

## 2024-10-25 RX ORDER — KETOROLAC TROMETHAMINE 30 MG/ML
30 INJECTION, SOLUTION INTRAMUSCULAR; INTRAVENOUS
Status: COMPLETED | OUTPATIENT
Start: 2024-10-25 | End: 2024-10-25

## 2024-10-25 RX ADMIN — DEXAMETHASONE SODIUM PHOSPHATE 4 MG: 4 INJECTION, SOLUTION INTRA-ARTICULAR; INTRALESIONAL; INTRAMUSCULAR; INTRAVENOUS; SOFT TISSUE at 07:10

## 2024-10-25 RX ADMIN — KETOROLAC TROMETHAMINE 30 MG: 30 INJECTION, SOLUTION INTRAMUSCULAR; INTRAVENOUS at 07:10

## 2024-10-25 NOTE — PROGRESS NOTES
Subjective:       Patient ID: Mimi Davis is a 49 y.o. female.    Chief Complaint: Pain (Lower Back and stomach started yesterday)    Pt with lower abdominal and bilateral lower back pain that started yesterday. No trauma, no dysuria, no heavy lifting, pain on motion.     Pain  Associated symptoms include abdominal pain. Pertinent negatives include no arthralgias, change in bowel habit, chest pain, chills, congestion, coughing, diaphoresis, fatigue, fever, headaches, joint swelling, myalgias, nausea, neck pain, numbness, rash, sore throat, vertigo, vomiting or weakness.     Review of Systems   Constitutional:  Negative for activity change, appetite change, chills, diaphoresis, fatigue, fever and unexpected weight change.   HENT:  Negative for nasal congestion, dental problem, drooling, ear discharge, ear pain, facial swelling, hearing loss, mouth sores, nosebleeds, postnasal drip, rhinorrhea, sinus pressure/congestion, sneezing, sore throat, tinnitus, trouble swallowing, voice change and goiter.    Eyes:  Negative for photophobia, discharge, itching and visual disturbance.   Respiratory:  Negative for apnea, cough, choking, chest tightness, shortness of breath, wheezing and stridor.    Cardiovascular:  Negative for chest pain, palpitations, leg swelling and claudication.   Gastrointestinal:  Positive for abdominal pain. Negative for abdominal distention, anal bleeding, blood in stool, change in bowel habit, constipation, diarrhea, nausea and vomiting.   Endocrine: Negative for cold intolerance, heat intolerance, polydipsia, polyphagia and polyuria.   Genitourinary:  Negative for bladder incontinence, decreased urine volume, difficulty urinating, dysuria, enuresis, flank pain, frequency, hematuria, nocturia, pelvic pain and urgency.   Musculoskeletal:  Positive for back pain. Negative for arthralgias, gait problem, joint swelling, leg pain, myalgias, neck pain, neck stiffness and joint deformity.    Integumentary:  Negative for pallor, rash, wound, breast mass and breast tenderness.   Allergic/Immunologic: Negative for environmental allergies, food allergies and immunocompromised state.   Neurological:  Negative for dizziness, vertigo, tremors, seizures, syncope, facial asymmetry, speech difficulty, weakness, light-headedness, numbness, headaches, memory loss and coordination difficulties.   Hematological:  Negative for adenopathy. Does not bruise/bleed easily.   Psychiatric/Behavioral:  Negative for agitation, behavioral problems, confusion, decreased concentration, dysphoric mood, hallucinations, self-injury, sleep disturbance and suicidal ideas. The patient is not nervous/anxious and is not hyperactive.    Breast: Negative for mass and tenderness        Objective:      Physical Exam  Vitals reviewed.   Constitutional:       Appearance: Normal appearance.   HENT:      Head: Normocephalic and atraumatic.      Right Ear: Tympanic membrane, ear canal and external ear normal.      Left Ear: Tympanic membrane, ear canal and external ear normal.      Nose: Nose normal.      Mouth/Throat:      Mouth: Mucous membranes are moist.      Pharynx: Oropharynx is clear.   Eyes:      Extraocular Movements: Extraocular movements intact.      Conjunctiva/sclera: Conjunctivae normal.      Pupils: Pupils are equal, round, and reactive to light.   Cardiovascular:      Rate and Rhythm: Normal rate and regular rhythm.      Pulses: Normal pulses.      Heart sounds: Normal heart sounds.   Pulmonary:      Effort: Pulmonary effort is normal.      Breath sounds: Normal breath sounds.   Abdominal:      General: Bowel sounds are normal.      Palpations: Abdomen is soft.   Musculoskeletal:         General: Normal range of motion.      Cervical back: Normal range of motion and neck supple.      Comments: No abdominal ttp. Mild low back bilateral ttp.    Skin:     General: Skin is warm and dry.   Neurological:      General: No focal  deficit present.      Mental Status: She is alert. Mental status is at baseline.   Psychiatric:         Mood and Affect: Mood normal.         Behavior: Behavior normal.         Thought Content: Thought content normal.         Judgment: Judgment normal.         Assessment:       1. Muscle spasm    2. Abdominal pain, unspecified abdominal location    3. Constipation, unspecified constipation type    4. Acute cystitis without hematuria        Plan:     Muscle spasm  -     dexAMETHasone injection 4 mg  -     ketorolac injection 30 mg    Abdominal pain, unspecified abdominal location  -     POCT URINALYSIS W/O SCOPE  -     X-Ray KUB; Future; Expected date: 10/25/2024    Constipation, unspecified constipation type    Acute cystitis without hematuria    Other orders  -     predniSONE (DELTASONE) 10 MG tablet; Take 1 tablet (10 mg total) by mouth once daily. for 5 days  Dispense: 5 tablet; Refill: 0  -     tiZANidine (ZANAFLEX) 4 MG tablet; Take 1 tablet (4 mg total) by mouth 3 (three) times daily as needed (spasm).  Dispense: 20 tablet; Refill: 0  -     ibuprofen (ADVIL,MOTRIN) 600 MG tablet; Take 1 tablet (600 mg total) by mouth every 6 (six) hours as needed for Pain.  Dispense: 20 tablet; Refill: 0  -     nitrofurantoin, macrocrystal-monohydrate, (MACROBID) 100 MG capsule; Take 1 capsule (100 mg total) by mouth 2 (two) times daily. for 5 days  Dispense: 10 capsule; Refill: 0  -     polyethylene glycol (GLYCOLAX) 17 gram/dose powder; 2 capfuls by mouth daily x 3 days then 1 capful daily  Dispense: 507 g; Refill: 0

## 2024-11-26 ENCOUNTER — TELEPHONE (OUTPATIENT)
Dept: FAMILY MEDICINE | Facility: CLINIC | Age: 49
End: 2024-11-26
Payer: COMMERCIAL

## 2024-11-26 DIAGNOSIS — J30.2 SEASONAL ALLERGIES: Primary | ICD-10-CM

## 2024-11-26 RX ORDER — CETIRIZINE HYDROCHLORIDE, PSEUDOEPHEDRINE HYDROCHLORIDE 5; 120 MG/1; MG/1
1 TABLET, FILM COATED, EXTENDED RELEASE ORAL 2 TIMES DAILY
Qty: 24 TABLET | Refills: 0 | Status: SHIPPED | OUTPATIENT
Start: 2024-11-26

## 2024-11-26 NOTE — TELEPHONE ENCOUNTER
Pt called wanting to know if you can send her something in for congestion and sinus pressure , pt is wanting to know if you can send it to [facility] on [address] thanks !

## 2024-12-01 NOTE — PROGRESS NOTES
"Subjective:       Mimi Davis is a 49 y.o. female who presents for evaluation of symptoms of a URI, possible sinusitis. Symptoms include congestion, cough described as productive, no  fever, and sore throat. Onset of symptoms was 2 weeks ago, and has been unchanged since that time. Treatment to date:  mucinex .    Review of Systems  Pertinent items are noted in HPI.     Objective:      /60 (BP Location: Left arm, Patient Position: Sitting)   Pulse 78   Temp 97.4 °F (36.3 °C) (Temporal)   Resp 18   Ht 5' 6" (1.676 m)   Wt 118.8 kg (261 lb 14.5 oz)   SpO2 98%   BMI 42.27 kg/m²   General appearance: alert, appears stated age, and cooperative  Head: Normocephalic, without obvious abnormality, atraumatic  Eyes: conjunctivae/corneas clear. PERRL, EOM's intact. Fundi benign.  Ears: abnormal TM right ear - dull and abnormal TM left ear - dull  Nose: Nares normal. Septum midline. Mucosa normal. No drainage or sinus tenderness., moderate congestion, sinus tenderness bilateral  Throat: abnormal findings: mild oropharyngeal erythema and PND  Lungs: clear to auscultation bilaterally  Heart: regular rate and rhythm, S1, S2 normal, no murmur, click, rub or gallop  Extremities: extremities normal, atraumatic, no cyanosis or edema  Skin: Skin color, texture, turgor normal. No rashes or lesions  Lymph nodes: Cervical, supraclavicular, and axillary nodes normal.  Neurologic: Alert and oriented X 3, normal strength and tone. Normal symmetric reflexes. Normal coordination and gait     Assessment:      sinusitis     Plan:      Discussed the diagnosis and treatment of sinusitis.  Suggested symptomatic OTC remedies.  Nasal saline spray for congestion.  Augmentin per orders.  Follow up as needed.   "

## 2024-12-21 DIAGNOSIS — K59.03 DRUG-INDUCED CONSTIPATION: ICD-10-CM

## 2024-12-23 RX ORDER — LINACLOTIDE 145 UG/1
145 CAPSULE, GELATIN COATED ORAL
Qty: 30 CAPSULE | Refills: 0 | Status: SHIPPED | OUTPATIENT
Start: 2024-12-23

## 2025-01-31 ENCOUNTER — OFFICE VISIT (OUTPATIENT)
Dept: FAMILY MEDICINE | Facility: CLINIC | Age: 50
End: 2025-01-31
Payer: COMMERCIAL

## 2025-01-31 VITALS
HEART RATE: 74 BPM | TEMPERATURE: 98 F | RESPIRATION RATE: 18 BRPM | OXYGEN SATURATION: 98 % | SYSTOLIC BLOOD PRESSURE: 118 MMHG | WEIGHT: 256 LBS | BODY MASS INDEX: 41.14 KG/M2 | DIASTOLIC BLOOD PRESSURE: 78 MMHG | HEIGHT: 66 IN

## 2025-01-31 DIAGNOSIS — Z13.220 SCREENING FOR LIPOID DISORDERS: ICD-10-CM

## 2025-01-31 DIAGNOSIS — I10 PRIMARY HYPERTENSION: ICD-10-CM

## 2025-01-31 DIAGNOSIS — Z00.00 ROUTINE CHECK-UP: Primary | ICD-10-CM

## 2025-01-31 DIAGNOSIS — Z11.4 SCREENING FOR HIV WITHOUT PRESENCE OF RISK FACTORS: ICD-10-CM

## 2025-01-31 DIAGNOSIS — E11.9 TYPE 2 DIABETES MELLITUS WITHOUT COMPLICATION, WITHOUT LONG-TERM CURRENT USE OF INSULIN: ICD-10-CM

## 2025-01-31 DIAGNOSIS — B00.1 RECURRENT COLD SORES: ICD-10-CM

## 2025-01-31 DIAGNOSIS — Z12.39 ENCOUNTER FOR SCREENING FOR MALIGNANT NEOPLASM OF BREAST, UNSPECIFIED SCREENING MODALITY: ICD-10-CM

## 2025-01-31 DIAGNOSIS — Z12.11 SCREENING FOR COLON CANCER: ICD-10-CM

## 2025-01-31 LAB
ALBUMIN SERPL BCP-MCNC: 3.5 G/DL (ref 3.5–5)
ALBUMIN/GLOB SERPL: 1.1 {RATIO}
ALP SERPL-CCNC: 69 U/L (ref 40–150)
ALT SERPL W P-5'-P-CCNC: 17 U/L
ANION GAP SERPL CALCULATED.3IONS-SCNC: 13 MMOL/L (ref 7–16)
AST SERPL W P-5'-P-CCNC: 23 U/L (ref 5–34)
BASOPHILS # BLD AUTO: 0.07 K/UL (ref 0–0.2)
BASOPHILS NFR BLD AUTO: 1.2 % (ref 0–1)
BILIRUB SERPL-MCNC: 0.2 MG/DL
BUN SERPL-MCNC: 8 MG/DL (ref 7–19)
BUN/CREAT SERPL: 10 (ref 6–20)
CALCIUM SERPL-MCNC: 9 MG/DL (ref 8.4–10.2)
CHLORIDE SERPL-SCNC: 107 MMOL/L (ref 98–107)
CHOLEST SERPL-MCNC: 178 MG/DL
CHOLEST/HDLC SERPL: 4.7 {RATIO}
CO2 SERPL-SCNC: 23 MMOL/L (ref 22–29)
CREAT SERPL-MCNC: 0.84 MG/DL (ref 0.55–1.02)
CREAT UR-MCNC: 274 MG/DL (ref 15–325)
DIFFERENTIAL METHOD BLD: ABNORMAL
EGFR (NO RACE VARIABLE) (RUSH/TITUS): 85 ML/MIN/1.73M2
EOSINOPHIL # BLD AUTO: 0.12 K/UL (ref 0–0.5)
EOSINOPHIL NFR BLD AUTO: 2.1 % (ref 1–4)
ERYTHROCYTE [DISTWIDTH] IN BLOOD BY AUTOMATED COUNT: 12.8 % (ref 11.5–14.5)
EST. AVERAGE GLUCOSE BLD GHB EST-MCNC: 157 MG/DL
GLOBULIN SER-MCNC: 3.3 G/DL (ref 2–4)
GLUCOSE SERPL-MCNC: 134 MG/DL (ref 74–100)
HBA1C MFR BLD HPLC: 7.1 %
HCT VFR BLD AUTO: 41 % (ref 38–47)
HDLC SERPL-MCNC: 38 MG/DL (ref 35–60)
HGB BLD-MCNC: 12.1 G/DL (ref 12–16)
HIV 1+O+2 AB SERPL QL: NORMAL
IMM GRANULOCYTES # BLD AUTO: 0.01 K/UL (ref 0–0.04)
IMM GRANULOCYTES NFR BLD: 0.2 % (ref 0–0.4)
LDLC SERPL CALC-MCNC: 85 MG/DL
LDLC/HDLC SERPL: 2.2 {RATIO}
LYMPHOCYTES # BLD AUTO: 2.33 K/UL (ref 1–4.8)
LYMPHOCYTES NFR BLD AUTO: 41.5 % (ref 27–41)
MCH RBC QN AUTO: 26.7 PG (ref 27–31)
MCHC RBC AUTO-ENTMCNC: 29.5 G/DL (ref 32–36)
MCV RBC AUTO: 90.3 FL (ref 80–96)
MICROALBUMIN UR-MCNC: 1.2 MG/DL
MICROALBUMIN/CREAT RATIO PNL UR: 4.4 MG/G (ref 0–30)
MONOCYTES # BLD AUTO: 0.35 K/UL (ref 0–0.8)
MONOCYTES NFR BLD AUTO: 6.2 % (ref 2–6)
MPC BLD CALC-MCNC: 11.3 FL (ref 9.4–12.4)
NEUTROPHILS # BLD AUTO: 2.73 K/UL (ref 1.8–7.7)
NEUTROPHILS NFR BLD AUTO: 48.8 % (ref 53–65)
NONHDLC SERPL-MCNC: 140 MG/DL
NRBC # BLD AUTO: 0 X10E3/UL
NRBC, AUTO (.00): 0 %
PLATELET # BLD AUTO: 264 K/UL (ref 150–400)
POTASSIUM SERPL-SCNC: 3.8 MMOL/L (ref 3.5–5.1)
PROT SERPL-MCNC: 6.8 G/DL (ref 6.4–8.3)
RBC # BLD AUTO: 4.54 M/UL (ref 4.2–5.4)
SODIUM SERPL-SCNC: 139 MMOL/L (ref 136–145)
TRIGL SERPL-MCNC: 275 MG/DL (ref 37–140)
TSH SERPL DL<=0.005 MIU/L-ACNC: 1.76 UIU/ML (ref 0.35–4.94)
VLDLC SERPL-MCNC: 55 MG/DL
WBC # BLD AUTO: 5.61 K/UL (ref 4.5–11)

## 2025-01-31 PROCEDURE — 4010F ACE/ARB THERAPY RXD/TAKEN: CPT | Mod: CPTII,,, | Performed by: NURSE PRACTITIONER

## 2025-01-31 PROCEDURE — 82043 UR ALBUMIN QUANTITATIVE: CPT | Mod: ,,, | Performed by: CLINICAL MEDICAL LABORATORY

## 2025-01-31 PROCEDURE — 36415 COLL VENOUS BLD VENIPUNCTURE: CPT | Mod: ,,, | Performed by: NURSE PRACTITIONER

## 2025-01-31 PROCEDURE — 80061 LIPID PANEL: CPT | Mod: ,,, | Performed by: CLINICAL MEDICAL LABORATORY

## 2025-01-31 PROCEDURE — 3078F DIAST BP <80 MM HG: CPT | Mod: CPTII,,, | Performed by: NURSE PRACTITIONER

## 2025-01-31 PROCEDURE — 82570 ASSAY OF URINE CREATININE: CPT | Mod: ,,, | Performed by: CLINICAL MEDICAL LABORATORY

## 2025-01-31 PROCEDURE — 80050 GENERAL HEALTH PANEL: CPT | Mod: ,,, | Performed by: CLINICAL MEDICAL LABORATORY

## 2025-01-31 PROCEDURE — 99214 OFFICE O/P EST MOD 30 MIN: CPT | Mod: ,,, | Performed by: NURSE PRACTITIONER

## 2025-01-31 PROCEDURE — 3074F SYST BP LT 130 MM HG: CPT | Mod: CPTII,,, | Performed by: NURSE PRACTITIONER

## 2025-01-31 PROCEDURE — 1159F MED LIST DOCD IN RCRD: CPT | Mod: CPTII,,, | Performed by: NURSE PRACTITIONER

## 2025-01-31 PROCEDURE — 87389 HIV-1 AG W/HIV-1&-2 AB AG IA: CPT | Mod: ,,, | Performed by: CLINICAL MEDICAL LABORATORY

## 2025-01-31 PROCEDURE — 3008F BODY MASS INDEX DOCD: CPT | Mod: CPTII,,, | Performed by: NURSE PRACTITIONER

## 2025-01-31 PROCEDURE — 83036 HEMOGLOBIN GLYCOSYLATED A1C: CPT | Mod: ,,, | Performed by: CLINICAL MEDICAL LABORATORY

## 2025-01-31 PROCEDURE — 1160F RVW MEDS BY RX/DR IN RCRD: CPT | Mod: CPTII,,, | Performed by: NURSE PRACTITIONER

## 2025-01-31 RX ORDER — SEMAGLUTIDE 1.34 MG/ML
1 INJECTION, SOLUTION SUBCUTANEOUS
Qty: 9 ML | Refills: 1 | Status: SHIPPED | OUTPATIENT
Start: 2025-01-31

## 2025-01-31 RX ORDER — VALACYCLOVIR HYDROCHLORIDE 500 MG/1
500 TABLET, FILM COATED ORAL 2 TIMES DAILY
Qty: 45 TABLET | Refills: 0 | Status: SHIPPED | OUTPATIENT
Start: 2025-01-31

## 2025-01-31 NOTE — PROGRESS NOTES
Elmira Morales NP   [address] [place] [address]     PATIENT NAME: Mimi Davis  : 1975  DATE: 25  MRN: [id number]      Billing Provider: Elmira Morales NP  Level of Service: [place] OFFICE/OUTPT VISIT, EST, LEVL IV, 30-39 MIN  Patient PCP Information       Provider PCP Type    Mohsen Taylor MD General            Reason for Visit / Chief Complaint: Hypertension (6 month followup HTN )       Update PCP  Update Chief Complaint         History of Present Illness / Problem Focused Workflow     Mimi Davis presents to the clinic with Hypertension (6 month followup HTN )     History of Present Illness    HPI:  Patient reports discomfort in her left hand, potentially related to carpal tunnel syndrome. Symptoms have been present for 2 months, including a tingling sensation and decreased strength in the affected hand. Patient has a history of carpal tunnel surgery on her right hand over 10 years ago.    Patient is due for several routine health screenings, including a mammogram and colon cancer screening. She has not had a diabetic foot exam previously.    Patient has run out of Ozempic, which she was taking at a dose of 1 mg. She requires a refill for Valtrex, used for fever blisters. Patient is under the care of a provider named Kyree for depression and believes Ozempic may be contributing to her depressive symptoms.    Patient denies neuropathy in her feet related to diabetes.    MEDICATIONS:  Patient is on Ozempic 1 mg for diabetes and medication for blood pressure. She is also taking Valtrex for fever blisters. She is on some blood pressure medication, but the refill has .    MEDICAL HISTORY:  Patient has a history of diabetes, carpal tunnel syndrome in her left hand, and depression.    SURGICAL HISTORY:  She underwent carpal tunnel surgery on her right hand over 10 years ago.    TEST RESULTS:  Patient's last A1C labs were good. She also had a recent CBC, and the results were  fine.    SOCIAL HISTORY:  Patient works at [employer].      ROS:  General: -fever, -chills, -fatigue, -weight gain, -weight loss  Eyes: -vision changes, -redness, -discharge  ENT: -ear pain, -nasal congestion, -sore throat  Cardiovascular: -chest pain, -palpitations, -lower extremity edema  Respiratory: -cough, -shortness of breath  Gastrointestinal: -abdominal pain, -nausea, -vomiting, -diarrhea, -constipation, -blood in stool  Genitourinary: -dysuria, -hematuria, -frequency  Musculoskeletal: +joint pain, -muscle pain  Skin: -rash, -lesion  Neurological: -headache, -dizziness, -numbness, +tingling  Psychiatric: -anxiety, -depression, -sleep difficulty                Medical / Social / Family History     Past Medical History:   Diagnosis Date    Anemia     Anxiety     Depression     Diabetes mellitus, type 2     Encounter for long-term (current) use of other medications     GERD (gastroesophageal reflux disease)     Goiter, non-toxic     H/O echocardiogram 08/06/2013    EF 65%,MILD MR,TR,WI    Hx of screening mammography 12/16/2021    Hypertension     Low back pain     Mitral valve disorder     Papanicolaou smear 07/15/2013    Vitamin D deficiency        Past Surgical History:   Procedure Laterality Date    CHOLECYSTECTOMY      INJECTION OF FACET JOINT Right 11/14/2023    Procedure: Thoacic Medial Branch Block T8-10;  Surgeon: Carol Gomez MD;  Location: [facility];  Service: Pain Management;  Laterality: Right;    TUBAL LIGATION  2001       Social History  Ms.  reports that she has never smoked. She has been exposed to tobacco smoke. She has never used smokeless tobacco. She reports that she does not drink alcohol and does not use drugs.    Family History  Ms.'s family history includes Diabetes in her mother; Hypertension in her mother; Kidney disease in her father; Mental illness in her brother and sister; Thyroid disease in her mother.    Medications and Allergies  "    Medications  Outpatient Medications Marked as Taking for the 1/31/25 encounter (Office Visit) with Elmira Morales NP   Medication Sig Dispense Refill    carvediloL (COREG) 25 MG tablet Take 25 mg by mouth 2 (two) times daily.      cetirizine-pseudoephedrine 5-120 mg Tb12 Take 1 tablet by mouth 2 (two) times a day. 24 tablet 0    cloNIDine (CATAPRES) 0.1 MG tablet Take 0.1 mg by mouth 2 (two) times daily. Prn High BP      clorazepate (TRANXENE) 3.75 MG Tab Take 1 tablet (3.75 mg total) by mouth 2 (two) times daily as needed (anxiety). 30 tablet 0    fluticasone propionate (FLONASE) 50 mcg/actuation nasal spray 2 sprays (100 mcg total) by Each Nostril route once daily. 16 g 11    hydroCHLOROthiazide (HYDRODIURIL) 25 MG tablet Take 25 mg by mouth.      ibuprofen (ADVIL,MOTRIN) 600 MG tablet Take 1 tablet (600 mg total) by mouth every 6 (six) hours as needed for Pain. 20 tablet 0    levomilnacipran (FETZIMA) 40 mg Cs24 Take 40 mg by mouth once daily. 90 capsule 3    LINZESS 145 mcg Cap capsule TAKE 1 CAPSULE BY MOUTH BEFORE BREAKFAST 30 capsule 0    meloxicam (MOBIC) 7.5 MG tablet Take 1 tablet by mouth once daily 90 tablet 0    polyethylene glycol (GLYCOLAX) 17 gram/dose powder 2 capfuls by mouth daily x 3 days then 1 capful daily 507 g 0    valsartan (DIOVAN) 160 MG tablet Take 320 mg by mouth.      valsartan (DIOVAN) 320 MG tablet Take 320 mg by mouth.      venlafaxine (EFFEXOR-XR) 75 MG 24 hr capsule Take 1 capsule (75 mg total) by mouth once daily. 90 capsule 3    [DISCONTINUED] valACYclovir (VALTREX) 500 MG tablet Take 1 tablet (500 mg total) by mouth 2 (two) times daily. 45 tablet 0       Allergies  Review of patient's allergies indicates:  No Known Allergies    Physical Examination   /78 (BP Location: Left arm, Patient Position: Sitting)   Pulse 74   Temp 98.1 °F (36.7 °C) (Temporal)   Resp 18   Ht 5' 6" (1.676 m)   Wt 116.1 kg (256 lb)   SpO2 98%   BMI 41.32 kg/m²    Physical Exam    General: " No acute distress. Well-developed. Well-nourished.  Eyes: EOMI. Sclerae anicteric.  HENT: Normocephalic. Atraumatic. Nares patent. Moist oral mucosa.  Cardiovascular: Regular rate. Regular rhythm. No murmurs. No rubs. No gallops. Normal S1, S2.  Respiratory: Normal respiratory effort. Clear to auscultation bilaterally. No rales. No rhonchi. No wheezing.  Musculoskeletal: No  obvious deformity.  Extremities: No lower extremity edema.  Neurological: Alert & oriented x3. No slurred speech. Normal gait.  Psychiatric: Normal mood. Normal affect. Good insight. Good judgment.  Skin: Warm. Dry. No rash.           Assessment and Plan (including Health Maintenance)      Problem List  Smart Sets  Document Outside HM   :    Assessment & Plan    IMPRESSION:  - Assessed A1C for diabetes management  - Evaluated left hand symptoms, likely related to carpal tunnel syndrome  - Considered switching from Ozempic to alternative GLP-1 agonist (e.g., Mounjaro) due to potential tolerance development and patient-reported depression  - Reviewed preventive care needs, including mammogram, colon cancer screening, and diabetic foot exam    DIABETES:  - Continued Ozempic 1 mg for diabetes management.  - Ordered A1C test and diabetic urine screen (microalbumin).  - Discussed the importance of annual diabetic foot exams for neuropathy and wound prevention.  - Noted that the patient ran out of Ozempic.  - Reviewed previous lab results, which were satisfactory.  - Considered switching to Mounjaro for improved weight loss and diabetes control.  - Planned to follow up after A1C results to discuss potential medication changes and obtain prior authorization if needed.    HYPERTENSION:  - Continued Thiazantric for blood pressure management as prescribed by Dr. Huang.    CARPAL TUNNEL SYNDROME:  - Advised the patient to wear a wrist brace at night for carpal tunnel symptoms.  - Noted patient's reports of left hand issues, including tingling and loss of  strength, for the past 2 months.  - Discussed potential referral to orthopedics if carpal tunnel symptoms persist after conservative management.    DEPRESSION:  - Noted that the patient is receiving treatment for depression from Kyree.  - Acknowledged the patient's concern about Ozempic potentially contributing to depression.  - Considered medication change due to potential link between Ozempic and depression.    BREAST CANCER SCREENING:  - Noted that the patient is not up to date on mammogram screening.  - Planned to schedule a mammogram at [facility], preferably on a Friday.    COLON CANCER SCREENING:  - Explained Cologuard as an alternative to colonoscopy for colon cancer screening.  - Ordered Cologuard test for colon cancer screening.  - Noted that the patient has never had a colonoscopy.              Health Maintenance Due   Topic Date Due    Diabetes Urine Screening  Never done    Foot Exam  Never done    Diabetic Eye Exam  Never done    HIV Screening  Never done    TETANUS VACCINE  Never done    Pneumococcal Vaccines (Age 0-49) (1 of 2 - PCV) Never done    Colorectal Cancer Screening  Never done    Mammogram  12/16/2022    Influenza Vaccine (1) Never done    COVID-19 Vaccine (1 - 2024-25 season) Never done    Hemoglobin A1c  10/12/2024       Problem List Items Addressed This Visit          Cardiac/Vascular    Hypertension    Relevant Orders    CBC Auto Differential    Comprehensive Metabolic Panel       Endocrine    Diabetes mellitus, type 2    Relevant Medications    semaglutide (OZEMPIC) 1 mg/dose (4 mg/3 mL)    Other Relevant Orders    Hemoglobin A1C    Microalbumin/Creatinine Ratio, Urine     Other Visit Diagnoses       Routine check-up    -  Primary    Relevant Orders    CBC Auto Differential    Comprehensive Metabolic Panel    Lipid Panel    TSH    Hemoglobin A1C    Screening for lipoid disorders        Relevant Orders    Lipid Panel    Recurrent cold sores        Relevant Medications    valACYclovir  (VALTREX) 500 MG tablet    Screening for colon cancer        Relevant Orders    Cologuard Screening (Multitarget Stool DNA)    Screening for HIV without presence of risk factors        Relevant Orders    HIV 1/2 Ag/Ab (4th Gen)    Encounter for screening for malignant neoplasm of breast, unspecified screening modality        Relevant Orders    Mammo Digital Screening Bilat w/ Damaso            Health Maintenance Topics with due status: Not Due       Topic Last Completion Date    Lipid Panel 04/12/2024    RSV Vaccine (Age 60+ and Pregnant patients) Not Due       Future Appointments   Date Time Provider Department Center   3/18/2025  3:30 PM [facility]   7/25/2025  1:30 PM Elmira Morales NP [facility]            Signature:  Elmira Morales NP      [address]  [place] [address]    Date of encounter: 1/31/25

## 2025-02-11 DIAGNOSIS — E11.9 TYPE 2 DIABETES MELLITUS WITHOUT COMPLICATION, WITHOUT LONG-TERM CURRENT USE OF INSULIN: Primary | ICD-10-CM

## 2025-02-11 RX ORDER — TIRZEPATIDE 2.5 MG/.5ML
2.5 INJECTION, SOLUTION SUBCUTANEOUS
Qty: 2 ML | Refills: 0 | Status: SHIPPED | OUTPATIENT
Start: 2025-02-11

## 2025-03-09 RX ORDER — AMOXICILLIN 500 MG/1
500 TABLET, FILM COATED ORAL EVERY 12 HOURS
Qty: 20 TABLET | Refills: 0 | Status: SHIPPED | OUTPATIENT
Start: 2025-03-09 | End: 2025-03-19

## 2025-03-11 DIAGNOSIS — E11.9 TYPE 2 DIABETES MELLITUS WITHOUT COMPLICATION, WITHOUT LONG-TERM CURRENT USE OF INSULIN: ICD-10-CM

## 2025-03-11 RX ORDER — TIRZEPATIDE 2.5 MG/.5ML
INJECTION, SOLUTION SUBCUTANEOUS
Qty: 4 ML | Refills: 0 | Status: SHIPPED | OUTPATIENT
Start: 2025-03-11

## 2025-04-01 ENCOUNTER — OFFICE VISIT (OUTPATIENT)
Dept: BEHAVIORAL HEALTH | Facility: CLINIC | Age: 50
End: 2025-04-01
Payer: COMMERCIAL

## 2025-04-01 VITALS
HEIGHT: 66 IN | DIASTOLIC BLOOD PRESSURE: 80 MMHG | WEIGHT: 263 LBS | RESPIRATION RATE: 20 BRPM | OXYGEN SATURATION: 99 % | HEART RATE: 98 BPM | BODY MASS INDEX: 42.27 KG/M2 | TEMPERATURE: 98 F | SYSTOLIC BLOOD PRESSURE: 130 MMHG

## 2025-04-01 DIAGNOSIS — F41.1 GENERALIZED ANXIETY DISORDER WITH PANIC ATTACKS: ICD-10-CM

## 2025-04-01 DIAGNOSIS — F41.0 GENERALIZED ANXIETY DISORDER WITH PANIC ATTACKS: ICD-10-CM

## 2025-04-01 DIAGNOSIS — Z79.899 LONG TERM USE OF DRUG: ICD-10-CM

## 2025-04-01 DIAGNOSIS — F33.0 MILD EPISODE OF RECURRENT MAJOR DEPRESSIVE DISORDER: Primary | ICD-10-CM

## 2025-04-01 PROCEDURE — G2211 COMPLEX E/M VISIT ADD ON: HCPCS | Mod: ,,, | Performed by: NURSE PRACTITIONER

## 2025-04-01 PROCEDURE — 3066F NEPHROPATHY DOC TX: CPT | Mod: CPTII,,, | Performed by: NURSE PRACTITIONER

## 2025-04-01 PROCEDURE — 99214 OFFICE O/P EST MOD 30 MIN: CPT | Mod: ,,, | Performed by: NURSE PRACTITIONER

## 2025-04-01 PROCEDURE — 1159F MED LIST DOCD IN RCRD: CPT | Mod: CPTII,,, | Performed by: NURSE PRACTITIONER

## 2025-04-01 PROCEDURE — 3079F DIAST BP 80-89 MM HG: CPT | Mod: CPTII,,, | Performed by: NURSE PRACTITIONER

## 2025-04-01 PROCEDURE — 90833 PSYTX W PT W E/M 30 MIN: CPT | Mod: ,,, | Performed by: NURSE PRACTITIONER

## 2025-04-01 PROCEDURE — 3075F SYST BP GE 130 - 139MM HG: CPT | Mod: CPTII,,, | Performed by: NURSE PRACTITIONER

## 2025-04-01 PROCEDURE — 4010F ACE/ARB THERAPY RXD/TAKEN: CPT | Mod: CPTII,,, | Performed by: NURSE PRACTITIONER

## 2025-04-01 PROCEDURE — 3051F HG A1C>EQUAL 7.0%<8.0%: CPT | Mod: CPTII,,, | Performed by: NURSE PRACTITIONER

## 2025-04-01 PROCEDURE — 3008F BODY MASS INDEX DOCD: CPT | Mod: CPTII,,, | Performed by: NURSE PRACTITIONER

## 2025-04-01 PROCEDURE — 3061F NEG MICROALBUMINURIA REV: CPT | Mod: CPTII,,, | Performed by: NURSE PRACTITIONER

## 2025-04-01 PROCEDURE — 1160F RVW MEDS BY RX/DR IN RCRD: CPT | Mod: CPTII,,, | Performed by: NURSE PRACTITIONER

## 2025-04-01 RX ORDER — VENLAFAXINE HYDROCHLORIDE 150 MG/1
150 CAPSULE, EXTENDED RELEASE ORAL DAILY
Qty: 90 CAPSULE | Refills: 3 | Status: SHIPPED | OUTPATIENT
Start: 2025-04-01 | End: 2026-04-01

## 2025-04-01 RX ORDER — CLORAZEPATE DIPOTASSIUM 3.75 MG/1
3.75 TABLET ORAL 2 TIMES DAILY PRN
Qty: 30 TABLET | Refills: 0 | Status: SHIPPED | OUTPATIENT
Start: 2025-04-01

## 2025-04-01 RX ORDER — LEVOMILNACIPRAN HYDROCHLORIDE 40 MG/1
40 CAPSULE, EXTENDED RELEASE ORAL DAILY
Qty: 90 CAPSULE | Refills: 3 | Status: SHIPPED | OUTPATIENT
Start: 2025-04-01 | End: 2026-03-27

## 2025-04-01 NOTE — LETTER
April 1, 2025      [facility]  [address] [facility] [place] [address]       Patient: Mimi Davis   YOB: 1975  Date of Visit: 04/01/2025    To Whom It May Concern:    DEL Davis  was at [facility] on 04/01/2025. The patient may return to work/school on 04/01/2025 with no restrictions. If you have any questions or concerns, or if I can be of further assistance, please do not hesitate to contact me.    Sincerely,    SHELBY ThomasP,PMADRYP

## 2025-04-01 NOTE — PROGRESS NOTES
Outpatient Psychiatry Follow-Up Visit (MD/NP)    4/1/2025    Clinical Status of Patient:  Outpatient (Ambulatory)    Chief Complaint:  Mimi Davis is a 49 y.o. female who presents today for follow-up of depression and anxiety.  Met with patient.      Interim Events/Subjective Report/Content of Current Session: Is doing well at work, went through a lot of changes that have been difficult. Has had worsening depression and the occasional panic attack. She is struggling with the fact that she has little accomplishments. She does not believe in herself as a person. Is considering going back to school and taking 1 class this fall. Will review class choices at follow up. Things are good in her marriage. Discussed the nature of anxiety and the physiological responses and how to control them through the use of coping skills and mechanisms. Discussed and recommended practicing mindfulness and meditation to limit stressors, anxiety, and depression symptoms.      Psychotherapy:  Target symptoms: distractability, lack of focus, recurrent depression, anxiety, work stress  Why chosen therapy is appropriate versus another modality: relevant to diagnosis, patient responds to this modality  Outcome monitoring methods: self-report, observation, checklist/rating scale  Therapeutic intervention type: supportive psychotherapy  Topics discussed/themes: work stress, difficulty managing affect in interpersonal relationships, building skills sets for symptom management, symptom recognition, financial stressors  The patient's response to the intervention is accepting. The patient's progress toward treatment goals is good.   Duration of intervention: 22 minutes.    Patient  reviewed this visit.     Review of Systems   PSYCHIATRIC: Pertinant items are noted in the narrative.  CONSTITUTIONAL: No weight gain or loss.   RESPIRATORY: No shortness of breath.  CARDIOVASCULAR: No tachycardia or chest pain.    Past Medical, Family and Social  History: The patient's past medical, family and social history have been reviewed and updated as appropriate within the electronic medical record - see encounter notes.    Perception of medication efficacy: Working ok  Medications tried and failed: See history.    Compliance: YES    Side effects: None    Risk Parameters:  Patient reports no suicidal ideation  Patient reports no homicidal ideation  Patient reports no self-injurious behavior  Patient reports no violent behavior    Exam (detailed: at least 9 elements; comprehensive: all 15 elements)     PHQ-9: Little interest or pleasure in doing things: (Patient-Rptd) (P) Several days  Feeling down, depressed, or hopeless: (Patient-Rptd) (P) Several days  Trouble falling or staying asleep, or sleeping too much: (Patient-Rptd) (P) Several days  Feeling tired or having little energy: (Patient-Rptd) (P) Several days  Poor appetite or overeating: (Patient-Rptd) (P) Several days  Feeling bad about yourself - or that you are a failure or have let yourself or your family down: (Patient-Rptd) (P) Several days  Trouble concentrating on things, such as reading the newspaper or watching television: (Patient-Rptd) (P) Several days  Moving or speaking so slowly that other people could have noticed. Or the opposite - being so fidgety or restless that you have been moving around a lot more than usual: (Patient-Rptd) (P) Not at all  Thoughts that you would be better off dead, or of hurting yourself in some way: (Patient-Rptd) (P) Not at all  PHQ-9 Total Score: (Patient-Rptd) (P) 7  If you checked off any problems, how difficult have these problems made it for you to do your work, take care of things at home, or get along with other people?: (Patient-Rptd) (P) Somewhat difficult  PHQ-9 Total Score: (Patient-Rptd) (P) 7    SOSA-7: SOSA-7 Questionnaire  Feeling nervous, anxious, or on edge: (Patient-Rptd) Several days  Not being able to stop or control worrying: (Patient-Rptd) Several  "days  Worrying too much about different things: (Patient-Rptd) Several days  Trouble relaxing: (Patient-Rptd) Several days  Being so restless that it is hard to sit still: (Patient-Rptd) Not at all  Becoming easily annoyed or irritable: (Patient-Rptd) Several days  Feeling afraid as if something awful might happen: (Patient-Rptd) Not at all  SOSA-7 Total Score: (Patient-Rptd) 5    Constitutional  Vitals:  Most recent vital signs, dated greater than 90 days prior to this appointment, were reviewed.   Vitals:    04/01/25 1010   BP: 130/80   Pulse: 98   Resp: 20   Temp: 98.1 °F (36.7 °C)   SpO2: 99%   Weight: 119.3 kg (263 lb)   Height: 5' 6" (1.676 m)     Body mass index is 42.45 kg/m².     General:  age appropriate, well nourished, casually dressed, neatly groomed, obese     Musculoskeletal  Muscle Strength/Tone:  no spasicity, no rigidity, no cogwheeling, no flaccidity, no paratonia, no dyskinesia   Gait & Station:  non-ataxic     Psychiatric  Speech:  no latency; no press   Mood & Affect:  anxious  congruent and appropriate   Thought Process:  normal and logical   Associations:  intact   Thought Content:  normal, no suicidality, no homicidality, delusions, or paranoia   Insight:  intact, has awareness of illness   Judgement: behavior is adequate to circumstances   Orientation:  grossly intact   Memory: intact for content of interview   Language: grossly intact   Attention Span & Concentration:  able to focus   Fund of Knowledge:  intact and appropriate to age and level of education, familiar with aspects of current personal life     Assessment and Diagnosis   Status/Progress: Based on the examination today, the patient's problem(s) is/are improved and inadequately controlled.  New problems have not been presented today.   Co-morbidities, Diagnostic uncertainty, and Lack of compliance are not complicating management of the primary condition.  There are no active rule-out diagnoses for this patient at this time. "     General Impression: Will increase Venlafaxine to 150 mg daily. Continue Clorazepate temporarily. Discussed that benzodiazepine class medications are not meant for long term use and the plan is to wean off completely while SSRI/SNRI increases in efficacy. Patient is in agreement with this. Continue Fetzima at 40 mg daily.       ICD-10-CM ICD-9-CM   1. Mild episode of recurrent major depressive disorder  F33.0 296.31   2. Generalized anxiety disorder with panic attacks  F41.1 300.02    F41.0 300.01   3. Long term use of drug  Z79.899 V58.69       Intervention/Counseling/Treatment Plan   Medication Management: The risks and benefits of medication were discussed with the patient. Verbalized understanding.  Counseling provided with patient as follows: importance of compliance with chosen treatment options was emphasized, risks and benefits of treatment options, including medications, were discussed with the patient, risk factor reduction, prognosis, patient education, instructions for  management, treatment, and follow-up were reviewed  Visit today included increased complexity associated with the care of the episodic problem Mild Major Depressive Disorder addressed and managing the longitudinal care of the patient due to the serious and/or complex managed problem(s) Generalized Anxiety Disorder with panic attacks, and Long term use of drug.    Medications:   Medication List with Changes/Refills   New Medications    VENLAFAXINE (EFFEXOR-XR) 150 MG CP24    Take 1 capsule (150 mg total) by mouth once daily.       Start Date: 4/1/2025  End Date: 4/1/2026   Current Medications    CARVEDILOL (COREG) 25 MG TABLET    Take 25 mg by mouth 2 (two) times daily.       Start Date: 9/10/2023 End Date: --    FLUTICASONE PROPIONATE (FLONASE) 50 MCG/ACTUATION NASAL SPRAY    2 sprays (100 mcg total) by Each Nostril route once daily.       Start Date: 1/5/2024  End Date: --    HYDROCHLOROTHIAZIDE (HYDRODIURIL) 25 MG TABLET    Take 25  mg by mouth.       Start Date: 9/12/2023 End Date: --    IBUPROFEN (ADVIL,MOTRIN) 600 MG TABLET    Take 1 tablet (600 mg total) by mouth every 6 (six) hours as needed for Pain.       Start Date: 10/25/2024End Date: --    LINZESS 145 MCG CAP CAPSULE    TAKE 1 CAPSULE BY MOUTH BEFORE BREAKFAST       Start Date: 12/23/2024End Date: --    MOUNJARO 2.5 MG/0.5 ML PNIJ    INJECT 1/2 (ONE-HALF) ML SUBCUTANEOUSLY  ONCE A WEEK       Start Date: 3/11/2025 End Date: --    VALACYCLOVIR (VALTREX) 500 MG TABLET    Take 1 tablet (500 mg total) by mouth 2 (two) times daily.       Start Date: 1/31/2025 End Date: --    VALSARTAN (DIOVAN) 160 MG TABLET    Take 320 mg by mouth.       Start Date: 10/4/2023 End Date: --    VALSARTAN (DIOVAN) 320 MG TABLET    Take 320 mg by mouth.       Start Date: 8/26/2024 End Date: --   Changed and/or Refilled Medications    Modified Medication Previous Medication    CLORAZEPATE (TRANXENE) 3.75 MG TAB clorazepate (TRANXENE) 3.75 MG Tab       Take 1 tablet (3.75 mg total) by mouth 2 (two) times daily as needed (anxiety).    Take 1 tablet (3.75 mg total) by mouth 2 (two) times daily as needed (anxiety).       Start Date: 4/1/2025  End Date: --    Start Date: 5/17/2024 End Date: 4/1/2025    LEVOMILNACIPRAN (FETZIMA) 40 MG CS24 levomilnacipran (FETZIMA) 40 mg Cs24       Take 40 mg by mouth once daily.    Take 40 mg by mouth once daily.       Start Date: 4/1/2025  End Date: 3/27/2026    Start Date: 6/17/2024 End Date: 4/1/2025   Discontinued Medications    CETIRIZINE-PSEUDOEPHEDRINE 5-120 MG TB12    Take 1 tablet by mouth 2 (two) times a day.       Start Date: 11/26/2024End Date: 4/1/2025    CLONIDINE (CATAPRES) 0.1 MG TABLET    Take 0.1 mg by mouth 2 (two) times daily. Prn High BP       Start Date: --        End Date: 4/1/2025    MELOXICAM (MOBIC) 7.5 MG TABLET    Take 1 tablet by mouth once daily       Start Date: 6/3/2024  End Date: 4/1/2025    POLYETHYLENE GLYCOL (GLYCOLAX) 17 GRAM/DOSE POWDER    2  capfuls by mouth daily x 3 days then 1 capful daily       Start Date: 10/25/2024End Date: 4/1/2025    SEMAGLUTIDE (OZEMPIC) 1 MG/DOSE (4 MG/3 ML)    Inject 1 mg into the skin every 7 days.       Start Date: 1/31/2025 End Date: 4/1/2025    VENLAFAXINE (EFFEXOR-XR) 75 MG 24 HR CAPSULE    Take 1 capsule (75 mg total) by mouth once daily.       Start Date: 6/17/2024 End Date: 4/1/2025     Return to Clinic: 3 months    Patient instructed to please go to emergency department if feeling as though you are going to harm to yourself or others or if you are in crisis; or to please call the clinic to report any worsening of symptoms or problems associated with medication.     Total Time: 48 minutes

## 2025-06-26 ENCOUNTER — OFFICE VISIT (OUTPATIENT)
Dept: FAMILY MEDICINE | Facility: CLINIC | Age: 50
End: 2025-06-26
Payer: COMMERCIAL

## 2025-06-26 VITALS
HEIGHT: 66 IN | RESPIRATION RATE: 20 BRPM | TEMPERATURE: 98 F | SYSTOLIC BLOOD PRESSURE: 139 MMHG | DIASTOLIC BLOOD PRESSURE: 89 MMHG | HEART RATE: 73 BPM | OXYGEN SATURATION: 98 % | WEIGHT: 261.31 LBS | BODY MASS INDEX: 42 KG/M2

## 2025-06-26 DIAGNOSIS — J01.90 ACUTE NON-RECURRENT SINUSITIS, UNSPECIFIED LOCATION: Primary | ICD-10-CM

## 2025-06-26 DIAGNOSIS — J30.2 SEASONAL ALLERGIES: ICD-10-CM

## 2025-06-26 DIAGNOSIS — J02.9 SORE THROAT: ICD-10-CM

## 2025-06-26 DIAGNOSIS — H92.09 OTALGIA, UNSPECIFIED LATERALITY: ICD-10-CM

## 2025-06-26 DIAGNOSIS — E11.9 TYPE 2 DIABETES MELLITUS WITHOUT COMPLICATION, WITHOUT LONG-TERM CURRENT USE OF INSULIN: ICD-10-CM

## 2025-06-26 RX ORDER — CEFTRIAXONE 1 G/1
1 INJECTION, POWDER, FOR SOLUTION INTRAMUSCULAR; INTRAVENOUS
Status: COMPLETED | OUTPATIENT
Start: 2025-06-26 | End: 2025-06-26

## 2025-06-26 RX ORDER — AMOXICILLIN 500 MG/1
500 TABLET, FILM COATED ORAL EVERY 12 HOURS
Qty: 20 TABLET | Refills: 0 | Status: SHIPPED | OUTPATIENT
Start: 2025-06-26 | End: 2025-06-27

## 2025-06-26 RX ORDER — DEXAMETHASONE SODIUM PHOSPHATE 4 MG/ML
8 INJECTION, SOLUTION INTRA-ARTICULAR; INTRALESIONAL; INTRAMUSCULAR; INTRAVENOUS; SOFT TISSUE
Status: COMPLETED | OUTPATIENT
Start: 2025-06-26 | End: 2025-06-26

## 2025-06-26 RX ORDER — FLUTICASONE PROPIONATE 50 MCG
2 SPRAY, SUSPENSION (ML) NASAL DAILY
Qty: 16 G | Refills: 11 | Status: SHIPPED | OUTPATIENT
Start: 2025-06-26

## 2025-06-26 RX ORDER — TIRZEPATIDE 5 MG/.5ML
5 INJECTION, SOLUTION SUBCUTANEOUS
Qty: 2 ML | Refills: 2 | Status: SHIPPED | OUTPATIENT
Start: 2025-06-26

## 2025-06-26 RX ADMIN — DEXAMETHASONE SODIUM PHOSPHATE 8 MG: 4 INJECTION, SOLUTION INTRA-ARTICULAR; INTRALESIONAL; INTRAMUSCULAR; INTRAVENOUS; SOFT TISSUE at 04:06

## 2025-06-26 RX ADMIN — CEFTRIAXONE 1 G: 1 INJECTION, POWDER, FOR SOLUTION INTRAMUSCULAR; INTRAVENOUS at 04:06

## 2025-06-26 NOTE — LETTER
June 26, 2025    Mimi Davis  [address] [facility] [place] [address]             [facility]  Family Medicine  [address]  [place] [address]  Phone: [phone]   June 26, 2025     Patient: Mimi Davis   YOB: 1975   Date of Visit: 6/26/2025       To Whom it May Concern:    Mimi Davis was seen in my clinic on 6/26/2025. She may return to work on 06/27/2025.    Please excuse her from any classes or work missed.    If you have any questions or concerns, please don't hesitate to call.    Sincerely,         Elmira Morales, NP

## 2025-06-26 NOTE — PROGRESS NOTES
Elmira Morales NP   [address] [place] [address]     PATIENT NAME: Mimi Davis  : 1975  DATE: 25  MRN: [id number]      Billing Provider: Elmira Morales NP  Level of Service:   Patient PCP Information       Provider PCP Type    Mohsen Taylor MD General            Reason for Visit / Chief Complaint: Sore Throat and Otalgia (X1 day)       Update PCP  Update Chief Complaint         History of Present Illness / Problem Focused Workflow     Mimi Davis presents to the clinic with Sore Throat and Otalgia (X1 day)     History of Present Illness    HPI:  Patient reports sore throat, ear pain, and sinus-related symptoms in the face, which started late yesterday. She describes sinus discomfort in the face, but notes it is not painful. She has not taken any medication for the symptoms due to their recent onset.    She denies fever and body aches.    MEDICATIONS:  Patient is on Mounjaro 2.5 mg. She is currently out of Flonase.    TEST RESULTS:  Patient recently underwent a strep test, which returned negative results.      ROS:  General: -fever, -chills, -fatigue, -weight gain, -weight loss  Eyes: -vision changes, -redness, -discharge  ENT: +ear pain, +nasal congestion, +sore throat  Cardiovascular: -chest pain, -palpitations, -lower extremity edema  Respiratory: -cough, -shortness of breath  Gastrointestinal: -abdominal pain, -nausea, -vomiting, -diarrhea, -constipation, -blood in stool  Genitourinary: -dysuria, -hematuria, -frequency  Musculoskeletal: -joint pain, -muscle pain  Skin: -rash, -lesion  Neurological: -headache, -dizziness, -numbness, -tingling  Psychiatric: -anxiety, -depression, -sleep difficulty  Head: +sinus pressure                Medical / Social / Family History     Past Medical History:   Diagnosis Date    Anemia     Anxiety     Depression     Diabetes mellitus, type 2     Encounter for long-term (current) use of other medications     GERD (gastroesophageal reflux disease)      Goiter, non-toxic     H/O echocardiogram 08/06/2013    EF 65%,MILD MR,TR,MN    Hx of screening mammography 12/16/2021    Hypertension     Low back pain     Mitral valve disorder     Papanicolaou smear 07/15/2013    Vitamin D deficiency        Past Surgical History:   Procedure Laterality Date    CHOLECYSTECTOMY      INJECTION OF FACET JOINT Right 11/14/2023    Procedure: Thoacic Medial Branch Block T8-10;  Surgeon: Carol Gomez MD;  Location: [facility];  Service: Pain Management;  Laterality: Right;    TUBAL LIGATION  2001       Social History  Ms.  reports that she has never smoked. She has been exposed to tobacco smoke. She has never used smokeless tobacco. She reports that she does not drink alcohol and does not use drugs.    Family History  Ms.'s family history includes Diabetes in her mother; Hypertension in her mother; Kidney disease in her father; Mental illness in her brother and sister; Thyroid disease in her mother.    Medications and Allergies     Medications  Outpatient Medications Marked as Taking for the 6/26/25 encounter (Office Visit) with Elmira Morales NP   Medication Sig Dispense Refill    carvediloL (COREG) 25 MG tablet Take 25 mg by mouth 2 (two) times daily.      clorazepate (TRANXENE) 3.75 MG Tab Take 1 tablet (3.75 mg total) by mouth 2 (two) times daily as needed (anxiety). 30 tablet 0    hydroCHLOROthiazide (HYDRODIURIL) 25 MG tablet Take 25 mg by mouth.      ibuprofen (ADVIL,MOTRIN) 600 MG tablet Take 1 tablet (600 mg total) by mouth every 6 (six) hours as needed for Pain. 20 tablet 0    levomilnacipran (FETZIMA) 40 mg Cs24 Take 40 mg by mouth once daily. 90 capsule 3    LINZESS 145 mcg Cap capsule TAKE 1 CAPSULE BY MOUTH BEFORE BREAKFAST 30 capsule 0    valACYclovir (VALTREX) 500 MG tablet Take 1 tablet (500 mg total) by mouth 2 (two) times daily. 45 tablet 0    valsartan (DIOVAN) 160 MG tablet Take 320 mg by mouth.      valsartan (DIOVAN) 320 MG tablet Take 320 mg by  "mouth.      venlafaxine (EFFEXOR-XR) 150 MG Cp24 Take 1 capsule (150 mg total) by mouth once daily. 90 capsule 3    [DISCONTINUED] fluticasone propionate (FLONASE) 50 mcg/actuation nasal spray 2 sprays (100 mcg total) by Each Nostril route once daily. 16 g 11    [DISCONTINUED] MOUNJARO 2.5 mg/0.5 mL PnIj INJECT 1/2 (ONE-HALF) ML SUBCUTANEOUSLY  ONCE A WEEK 4 mL 0     Current Facility-Administered Medications for the 6/26/25 encounter (Office Visit) with Elmira Morales NP   Medication Dose Route Frequency Provider Last Rate Last Admin    [COMPLETED] cefTRIAXone injection 1 g  1 g Intramuscular 1 time in Clinic/HOD Elmira Morales NP   1 g at 06/26/25 1601    [COMPLETED] dexAMETHasone injection 8 mg  8 mg Intramuscular 1 time in Clinic/HOD Elmira Morales NP   8 mg at 06/26/25 1601       Allergies  Review of patient's allergies indicates:  No Known Allergies    Physical Examination   /89 (BP Location: Right arm, Patient Position: Sitting)   Pulse 73   Temp 97.9 °F (36.6 °C) (Temporal)   Resp 20   Ht 5' 6" (1.676 m)   Wt 118.5 kg (261 lb 4.8 oz)   SpO2 98%   BMI 42.17 kg/m²    Physical Exam    General: No acute distress. Well-developed. Well-nourished.  Eyes: EOMI. Sclerae anicteric.  HENT: Normocephalic. Atraumatic. Nares patent. Moist oral mucosa. Posterior pharynx injected. Post nasal drip. Nasal congestion present. Sinus pressure present.  Ears: Left TM dull. Right TM dull.  Cardiovascular: Regular rate. Regular rhythm. No murmurs. No rubs. No gallops. Normal S1, S2.  Respiratory: Normal respiratory effort. Clear to auscultation bilaterally. No rales. No rhonchi. No wheezing.  Musculoskeletal: No  obvious deformity.  Extremities: No lower extremity edema.  Neurological: Alert & oriented x3. No slurred speech. Normal gait.  Psychiatric: Normal mood. Normal affect. Good insight. Good judgment.  Skin: Warm. Dry. No rash.           Assessment and Plan (including Health Maintenance)      Problem List  " Smart Sets  Document Outside HM   :    Assessment & Plan    - Assessed symptoms of sore throat as potentially viral in nature.  - Patient reports sore throat and ear pain, with no fever or body aches.  - Examination revealed injected posterior pharynx with post nasal drip.  - Administered antibiotic and steroid injections in office.  - Prescribed oral antibiotic to begin the day after injections.  - Discussed potential viral cause and explained that symptoms may need to run their course if treatment is ineffective.    - Increased Mounjaro dosage from 2.5 mg to 5 mg based on response and request.     This note was generated with the assistance of ambient listening technology. Verbal consent was obtained by the patient and accompanying visitor(s) for the recording of patient appointment to facilitate this note. I attest to having reviewed and edited the generated note for accuracy, though some syntax or spelling errors may persist. Please contact the author of this note for any clarification.              Health Maintenance Due   Topic Date Due    Foot Exam  Never done    Diabetic Eye Exam  Never done    TETANUS VACCINE  Never done    Pneumococcal Vaccines (Age 0-49) (1 of 2 - PCV) Never done    Colorectal Cancer Screening  Never done    Mammogram  12/16/2022    COVID-19 Vaccine (3 - 2024-25 season) 09/01/2024       Problem List Items Addressed This Visit          Endocrine    Diabetes mellitus, type 2    Relevant Medications    tirzepatide (MOUNJARO) 5 mg/0.5 mL PnIj     Other Visit Diagnoses         Otalgia, unspecified laterality    -  Primary    Relevant Orders    POCT Strep A, Molecular      Sore throat        Relevant Orders    POCT Strep A, Molecular      Seasonal allergies        Relevant Medications    fluticasone propionate (FLONASE) 50 mcg/actuation nasal spray      Acute non-recurrent sinusitis, unspecified location        Relevant Medications    dexAMETHasone injection 8 mg (Completed)    cefTRIAXone  injection 1 g (Completed)    amoxicillin (AMOXIL) 500 MG Tab    fluticasone propionate (FLONASE) 50 mcg/actuation nasal spray            Health Maintenance Topics with due status: Not Due       Topic Last Completion Date    Diabetes Urine Screening 01/31/2025    Lipid Panel 01/31/2025    Hemoglobin A1c 01/31/2025    Influenza Vaccine Not Due    RSV Vaccine (Age 60+ and Pregnant patients) Not Due       Future Appointments   Date Time Provider Department Center   8/1/2025  1:20 PM Elmira Morales NP [facility]   10/1/2025  3:30 PM [facility]iga[facility]            Signature:  Elmira Morales NP      [id number]  [place] [address]    Date of encounter: 6/26/25

## 2025-06-27 DIAGNOSIS — J01.90 ACUTE NON-RECURRENT SINUSITIS, UNSPECIFIED LOCATION: ICD-10-CM

## 2025-06-27 RX ORDER — AMOXICILLIN 500 MG/1
TABLET, FILM COATED ORAL
Qty: 20 TABLET | Refills: 0 | Status: SHIPPED | OUTPATIENT
Start: 2025-06-27

## (undated) DEVICE — NDL SPINAL SPINOCAN 22GX3.5

## (undated) DEVICE — KIT IV START RUSH

## (undated) DEVICE — TRAY NERVE BLOCK UNIV 10/CA

## (undated) DEVICE — APPLICATOR CHLORAPREP ORN 26ML

## (undated) DEVICE — CATH IV 22G X 1 AUTOGUARD

## (undated) DEVICE — SOL CONTINU-FLO SET 2 LAV

## (undated) DEVICE — GLOVE PROTEXIS PI SYN SURG 6.5